# Patient Record
Sex: FEMALE | Race: WHITE
[De-identification: names, ages, dates, MRNs, and addresses within clinical notes are randomized per-mention and may not be internally consistent; named-entity substitution may affect disease eponyms.]

---

## 2017-04-27 ENCOUNTER — PRIOR ORIGINAL RECORDS (OUTPATIENT)
Dept: OTHER | Age: 57
End: 2017-04-27

## 2017-06-27 ENCOUNTER — HOSPITAL ENCOUNTER (OUTPATIENT)
Dept: MAMMOGRAPHY | Facility: HOSPITAL | Age: 57
Discharge: HOME | End: 2017-06-27
Attending: FAMILY MEDICINE
Payer: COMMERCIAL

## 2017-06-27 DIAGNOSIS — R92.8 ABNORMAL SCREENING MAMMOGRAM: ICD-10-CM

## 2017-06-27 PROCEDURE — 77066 DX MAMMO INCL CAD BI: CPT | Performed by: FAMILY MEDICINE

## 2017-12-14 ENCOUNTER — LAB ENCOUNTER (OUTPATIENT)
Dept: LAB | Facility: HOSPITAL | Age: 57
End: 2017-12-14
Attending: FAMILY MEDICINE
Payer: COMMERCIAL

## 2017-12-14 DIAGNOSIS — R35.0 URINARY FREQUENCY: Primary | ICD-10-CM

## 2017-12-14 PROCEDURE — 87086 URINE CULTURE/COLONY COUNT: CPT

## 2017-12-14 PROCEDURE — 81001 URINALYSIS AUTO W/SCOPE: CPT

## 2017-12-18 ENCOUNTER — OFFICE VISIT (OUTPATIENT)
Dept: OBGYN CLINIC | Facility: CLINIC | Age: 57
End: 2017-12-18

## 2017-12-18 VITALS
RESPIRATION RATE: 18 BRPM | HEART RATE: 64 BPM | SYSTOLIC BLOOD PRESSURE: 124 MMHG | WEIGHT: 218 LBS | DIASTOLIC BLOOD PRESSURE: 66 MMHG | HEIGHT: 62 IN | BODY MASS INDEX: 40.12 KG/M2

## 2017-12-18 DIAGNOSIS — Z01.419 WELL WOMAN EXAM WITH ROUTINE GYNECOLOGICAL EXAM: Primary | ICD-10-CM

## 2017-12-18 PROCEDURE — 87624 HPV HI-RISK TYP POOLED RSLT: CPT | Performed by: OBSTETRICS & GYNECOLOGY

## 2017-12-18 PROCEDURE — 88175 CYTOPATH C/V AUTO FLUID REDO: CPT | Performed by: OBSTETRICS & GYNECOLOGY

## 2017-12-18 PROCEDURE — 99386 PREV VISIT NEW AGE 40-64: CPT | Performed by: OBSTETRICS & GYNECOLOGY

## 2017-12-18 RX ORDER — SERTRALINE HYDROCHLORIDE 25 MG/1
TABLET, FILM COATED ORAL DAILY
COMMUNITY
Start: 2017-12-05 | End: 2019-03-14

## 2017-12-18 RX ORDER — ALPRAZOLAM 0.5 MG/1
0.25 TABLET ORAL AS NEEDED
Refills: 0 | COMMUNITY
Start: 2017-12-05

## 2017-12-18 NOTE — PROGRESS NOTES
Judge Sidhu is a 61year old female  No LMP recorded. Patient is not currently having periods (Reason: Menopause). Patient presents with:  Wellness Visit: new pt annual. pt notices odor with urinatio off and on x 1 month.   .  She has no complaints on file     Other Topics Concern    Caffeine Concern No    Exercise No     Social History Narrative   None on file       FAMILY HISTORY:  Family History   Problem Relation Age of Onset   • Cancer Mother    • Breast Cancer Mother 54   • Other Lidia Reyes Mother thyromegaly, no nodules, no adenopathy  Breast: normal without palpable masses, tenderness, asymmetry, nipple discharge, nipple retraction or skin changes  Abdomen:  soft, nontender, nondistended, no masses  Skin/Hair: no unusual rashes or bruises  Extremi

## 2018-03-10 ENCOUNTER — APPOINTMENT (OUTPATIENT)
Dept: GENERAL RADIOLOGY | Facility: HOSPITAL | Age: 58
End: 2018-03-10
Attending: EMERGENCY MEDICINE
Payer: COMMERCIAL

## 2018-03-10 ENCOUNTER — HOSPITAL ENCOUNTER (EMERGENCY)
Facility: HOSPITAL | Age: 58
Discharge: HOME | End: 2018-03-10
Attending: EMERGENCY MEDICINE
Payer: COMMERCIAL

## 2018-03-10 ENCOUNTER — PRIOR ORIGINAL RECORDS (OUTPATIENT)
Dept: OTHER | Age: 58
End: 2018-03-10

## 2018-03-10 VITALS
SYSTOLIC BLOOD PRESSURE: 125 MMHG | HEIGHT: 62 IN | RESPIRATION RATE: 16 BRPM | WEIGHT: 200 LBS | BODY MASS INDEX: 36.8 KG/M2 | HEART RATE: 80 BPM | DIASTOLIC BLOOD PRESSURE: 78 MMHG | OXYGEN SATURATION: 100 % | TEMPERATURE: 98 F

## 2018-03-10 DIAGNOSIS — K21.00 GASTROESOPHAGEAL REFLUX DISEASE WITH ESOPHAGITIS: Primary | ICD-10-CM

## 2018-03-10 LAB
ALBUMIN SERPL-MCNC: 3.7 G/DL (ref 3.5–4.8)
ALP LIVER SERPL-CCNC: 71 U/L (ref 46–118)
ALT SERPL-CCNC: 19 U/L (ref 14–54)
AST SERPL-CCNC: 10 U/L (ref 15–41)
BASOPHILS # BLD AUTO: 0.03 X10(3) UL (ref 0–0.1)
BASOPHILS NFR BLD AUTO: 0.4 %
BILIRUB SERPL-MCNC: 0.8 MG/DL (ref 0.1–2)
BUN BLD-MCNC: 14 MG/DL (ref 8–20)
CALCIUM BLD-MCNC: 9.5 MG/DL (ref 8.3–10.3)
CHLORIDE: 106 MMOL/L (ref 101–111)
CO2: 26 MMOL/L (ref 22–32)
CREAT BLD-MCNC: 0.82 MG/DL (ref 0.55–1.02)
EOSINOPHIL # BLD AUTO: 0.04 X10(3) UL (ref 0–0.3)
EOSINOPHIL NFR BLD AUTO: 0.5 %
ERYTHROCYTE [DISTWIDTH] IN BLOOD BY AUTOMATED COUNT: 12.8 % (ref 11.5–16)
GLUCOSE BLD-MCNC: 107 MG/DL (ref 70–99)
HCT VFR BLD AUTO: 39.7 % (ref 34–50)
HGB BLD-MCNC: 13 G/DL (ref 12–16)
IMMATURE GRANULOCYTE COUNT: 0.02 X10(3) UL (ref 0–1)
IMMATURE GRANULOCYTE RATIO %: 0.3 %
LYMPHOCYTES # BLD AUTO: 1.81 X10(3) UL (ref 0.9–4)
LYMPHOCYTES NFR BLD AUTO: 22.9 %
M PROTEIN MFR SERPL ELPH: 7.8 G/DL (ref 6.1–8.3)
MCH RBC QN AUTO: 29.3 PG (ref 27–33.2)
MCHC RBC AUTO-ENTMCNC: 32.7 G/DL (ref 31–37)
MCV RBC AUTO: 89.6 FL (ref 81–100)
MONOCYTES # BLD AUTO: 0.58 X10(3) UL (ref 0.1–1)
MONOCYTES NFR BLD AUTO: 7.3 %
NEUTROPHIL ABS PRELIM: 5.44 X10 (3) UL (ref 1.3–6.7)
NEUTROPHILS # BLD AUTO: 5.44 X10(3) UL (ref 1.3–6.7)
NEUTROPHILS NFR BLD AUTO: 68.6 %
PLATELET # BLD AUTO: 289 10(3)UL (ref 150–450)
POTASSIUM SERPL-SCNC: 3.6 MMOL/L (ref 3.6–5.1)
RBC # BLD AUTO: 4.43 X10(6)UL (ref 3.8–5.1)
RED CELL DISTRIBUTION WIDTH-SD: 41.9 FL (ref 35.1–46.3)
SODIUM SERPL-SCNC: 139 MMOL/L (ref 136–144)
TROPONIN: <0.046 NG/ML (ref ?–0.05)
WBC # BLD AUTO: 7.9 X10(3) UL (ref 4–13)

## 2018-03-10 PROCEDURE — 93010 ELECTROCARDIOGRAM REPORT: CPT

## 2018-03-10 PROCEDURE — 84484 ASSAY OF TROPONIN QUANT: CPT | Performed by: EMERGENCY MEDICINE

## 2018-03-10 PROCEDURE — 99285 EMERGENCY DEPT VISIT HI MDM: CPT

## 2018-03-10 PROCEDURE — 74220 X-RAY XM ESOPHAGUS 1CNTRST: CPT | Performed by: EMERGENCY MEDICINE

## 2018-03-10 PROCEDURE — 85025 COMPLETE CBC W/AUTO DIFF WBC: CPT | Performed by: EMERGENCY MEDICINE

## 2018-03-10 PROCEDURE — 80053 COMPREHEN METABOLIC PANEL: CPT | Performed by: EMERGENCY MEDICINE

## 2018-03-10 PROCEDURE — 93005 ELECTROCARDIOGRAM TRACING: CPT

## 2018-03-10 PROCEDURE — 71045 X-RAY EXAM CHEST 1 VIEW: CPT | Performed by: EMERGENCY MEDICINE

## 2018-03-10 PROCEDURE — 36415 COLL VENOUS BLD VENIPUNCTURE: CPT

## 2018-03-10 RX ORDER — MAGNESIUM HYDROXIDE/ALUMINUM HYDROXICE/SIMETHICONE 120; 1200; 1200 MG/30ML; MG/30ML; MG/30ML
30 SUSPENSION ORAL ONCE
Status: COMPLETED | OUTPATIENT
Start: 2018-03-10 | End: 2018-03-10

## 2018-03-10 NOTE — ED NOTES
Pt awake and alert, appears comfortable. Pt c/o discomfort to \"esophagus\" with swallowing. Pt states that when she tries to eat, she has no discomfort to throat, but feels discomfort near chest when food is going down.  Pt states water does not cause any

## 2018-03-10 NOTE — ED INITIAL ASSESSMENT (HPI)
Pt c/o foreign body in esophagus, Pt rpt eating dough pretzel yesterday that got \"stuck\" Pt states she was able to eat dinner last night and has tolerated liquids w/o difficulty; but has persistent discomfort in lower GI area.  Pt denies vomiting

## 2018-03-10 NOTE — ED PROVIDER NOTES
Patient Seen in: [facility] Emergency Department    History   Patient presents with:  FB in Throat (GI, respiratory)    Stated Complaint: Foreign body stuck in esophagus     HPI    62-year-old female comes the hospital with a complaint of having dif negative except as noted above.     Physical Exam   ED Triage Vitals [03/10/18 1722]  BP: 140/85  Pulse: 80  Resp: 18  Temp: 97.9 °F (36.6 °C)  Temp src: Temporal  SpO2: 98 %  O2 Device: None (Room air)    Current:/85   Pulse 80   Temp 97.9 °F (36.6 ° INDICATIONS:  Foreign body stuck in esophagus  PATIENT STATED HISTORY: (As transcribed by Technologist)  Patient feels as though a pretzel got stuck in her esophagus.    FLUOROSCOPY IMAGES OBTAINED:  17 FLUOROSCOPY TIME:  0:51MIN RADIATION DOSE (AIR KERMA P 3/10/18 1744)       ED Course as of Mar 10 1900  ------------------------------------------------------------    Patient is feeling better while here. Patient symptoms consistent with reflux and esophagitis.   Patient will be discharged home and continue w

## 2018-03-11 LAB
ATRIAL RATE: 70 BPM
P AXIS: 62 DEGREES
P-R INTERVAL: 140 MS
Q-T INTERVAL: 396 MS
QRS DURATION: 94 MS
QTC CALCULATION (BEZET): 427 MS
R AXIS: -10 DEGREES
T AXIS: -3 DEGREES
VENTRICULAR RATE: 70 BPM

## 2018-05-15 ENCOUNTER — MYAURORA ACCOUNT LINK (OUTPATIENT)
Dept: OTHER | Age: 58
End: 2018-05-15

## 2018-05-15 ENCOUNTER — PRIOR ORIGINAL RECORDS (OUTPATIENT)
Dept: OTHER | Age: 58
End: 2018-05-15

## 2018-05-25 ENCOUNTER — HOSPITAL ENCOUNTER (OUTPATIENT)
Dept: MAMMOGRAPHY | Facility: HOSPITAL | Age: 58
Discharge: HOME | End: 2018-05-25
Attending: SURGERY
Payer: COMMERCIAL

## 2018-05-25 DIAGNOSIS — Z98.890 STATUS POST LEFT BREAST LUMPECTOMY: ICD-10-CM

## 2018-05-25 DIAGNOSIS — Z86.000 HISTORY OF LOBULAR CARCINOMA IN SITU OF BREAST: ICD-10-CM

## 2018-05-25 PROCEDURE — 77062 BREAST TOMOSYNTHESIS BI: CPT | Performed by: SURGERY

## 2018-05-25 PROCEDURE — 76642 ULTRASOUND BREAST LIMITED: CPT | Performed by: SURGERY

## 2018-05-25 PROCEDURE — 77066 DX MAMMO INCL CAD BI: CPT | Performed by: SURGERY

## 2018-06-01 LAB
ALBUMIN: 3.7 G/DL
ALKALINE PHOSPHATATE(ALK PHOS): 71 IU/L
BILIRUBIN TOTAL: 0.8 MG/DL
BUN: 14 MG/DL
CALCIUM: 9.5 MG/DL
CHLORIDE: 106 MEQ/L
CREATININE, SERUM: 0.82 MG/DL
GLUCOSE: 107 MG/DL
HEMATOCRIT: 39.7 %
HEMOGLOBIN: 13 G/DL
PLATELETS: 289 K/UL
POTASSIUM, SERUM: 3.6 MEQ/L
PROTEIN, TOTAL: 7.8 G/DL
RED BLOOD COUNT: 4.43 X 10-6/U
SGOT (AST): 10 IU/L
SGPT (ALT): 19 IU/L
SODIUM: 139 MEQ/L
WHITE BLOOD COUNT: 7.9 X 10-3/U

## 2018-07-09 ENCOUNTER — SURGERY (OUTPATIENT)
Age: 58
End: 2018-07-09

## 2018-07-09 ENCOUNTER — HOSPITAL ENCOUNTER (OUTPATIENT)
Facility: HOSPITAL | Age: 58
Setting detail: HOSPITAL OUTPATIENT SURGERY
Discharge: HOME | End: 2018-07-09
Attending: INTERNAL MEDICINE | Admitting: INTERNAL MEDICINE
Payer: COMMERCIAL

## 2018-07-09 VITALS
HEART RATE: 76 BPM | SYSTOLIC BLOOD PRESSURE: 118 MMHG | DIASTOLIC BLOOD PRESSURE: 87 MMHG | TEMPERATURE: 98 F | HEIGHT: 62 IN | RESPIRATION RATE: 16 BRPM | WEIGHT: 212 LBS | BODY MASS INDEX: 39.01 KG/M2 | OXYGEN SATURATION: 98 %

## 2018-07-09 DIAGNOSIS — Z12.11 COLON CANCER SCREENING: ICD-10-CM

## 2018-07-09 DIAGNOSIS — K21.9 GASTROESOPHAGEAL REFLUX DISEASE, ESOPHAGITIS PRESENCE NOT SPECIFIED: ICD-10-CM

## 2018-07-09 DIAGNOSIS — R13.19 ESOPHAGEAL DYSPHAGIA: ICD-10-CM

## 2018-07-09 PROCEDURE — 0DBB8ZX EXCISION OF ILEUM, VIA NATURAL OR ARTIFICIAL OPENING ENDOSCOPIC, DIAGNOSTIC: ICD-10-PCS | Performed by: INTERNAL MEDICINE

## 2018-07-09 PROCEDURE — 99153 MOD SED SAME PHYS/QHP EA: CPT | Performed by: INTERNAL MEDICINE

## 2018-07-09 PROCEDURE — 0DB38ZX EXCISION OF LOWER ESOPHAGUS, VIA NATURAL OR ARTIFICIAL OPENING ENDOSCOPIC, DIAGNOSTIC: ICD-10-PCS | Performed by: INTERNAL MEDICINE

## 2018-07-09 PROCEDURE — 99152 MOD SED SAME PHYS/QHP 5/>YRS: CPT | Performed by: INTERNAL MEDICINE

## 2018-07-09 PROCEDURE — 0DB18ZX EXCISION OF UPPER ESOPHAGUS, VIA NATURAL OR ARTIFICIAL OPENING ENDOSCOPIC, DIAGNOSTIC: ICD-10-PCS | Performed by: INTERNAL MEDICINE

## 2018-07-09 PROCEDURE — 88305 TISSUE EXAM BY PATHOLOGIST: CPT | Performed by: INTERNAL MEDICINE

## 2018-07-09 PROCEDURE — 0DB68ZX EXCISION OF STOMACH, VIA NATURAL OR ARTIFICIAL OPENING ENDOSCOPIC, DIAGNOSTIC: ICD-10-PCS | Performed by: INTERNAL MEDICINE

## 2018-07-09 RX ORDER — MIDAZOLAM HYDROCHLORIDE 1 MG/ML
INJECTION INTRAMUSCULAR; INTRAVENOUS
Status: DISCONTINUED | OUTPATIENT
Start: 2018-07-09 | End: 2018-07-09

## 2018-07-09 RX ORDER — SODIUM CHLORIDE, SODIUM LACTATE, POTASSIUM CHLORIDE, CALCIUM CHLORIDE 600; 310; 30; 20 MG/100ML; MG/100ML; MG/100ML; MG/100ML
INJECTION, SOLUTION INTRAVENOUS CONTINUOUS
Status: DISCONTINUED | OUTPATIENT
Start: 2018-07-09 | End: 2018-07-09

## 2018-07-09 NOTE — OPERATIVE REPORT
[facility]  Esophagogastroduodenoscopy and Colonoscopy Report    [address] Patient Status:  Hospital Outpatient Surgery   Date of Birth 1/27/1960 MRN [id number]   [facility] ENDOSCOPY Attending Harry Root MD esophagus and advanced to the descending duodenum. The scope was withdrawn and the mucosa was observed for abnormalities. Subsequently, the Olympus video colonoscope was inserted into the rectum and advanced to the terminal ileum.   The scope was withdraw

## 2018-07-09 NOTE — H&P
[facility]  Pre-procedure History and Physical      Yoon Alvarado Patient Status:  Hospital Outpatient Surgery    1960 MRN [id number]   [facility] ENDOSCOPY Attending Angel Reyes MD   Hosp Day # 0 PCP Sal Del Cid MD        risks, including bleeding, perforation and anesthetic complications. The limitations of the procedure were reviewed. The patient agrees and all questions were answered.  2

## 2018-10-02 ENCOUNTER — OFFICE VISIT (OUTPATIENT)
Dept: FAMILY MEDICINE CLINIC | Facility: CLINIC | Age: 58
End: 2018-10-02
Payer: COMMERCIAL

## 2018-10-02 VITALS
OXYGEN SATURATION: 98 % | SYSTOLIC BLOOD PRESSURE: 134 MMHG | DIASTOLIC BLOOD PRESSURE: 72 MMHG | RESPIRATION RATE: 16 BRPM | WEIGHT: 210 LBS | BODY MASS INDEX: 38.64 KG/M2 | HEIGHT: 62 IN | HEART RATE: 80 BPM | TEMPERATURE: 98 F

## 2018-10-02 DIAGNOSIS — R05.9 COUGH: ICD-10-CM

## 2018-10-02 DIAGNOSIS — J01.00 ACUTE NON-RECURRENT MAXILLARY SINUSITIS: Primary | ICD-10-CM

## 2018-10-02 PROCEDURE — 99213 OFFICE O/P EST LOW 20 MIN: CPT | Performed by: NURSE PRACTITIONER

## 2018-10-02 RX ORDER — FLUTICASONE PROPIONATE 50 MCG
2 SPRAY, SUSPENSION (ML) NASAL DAILY
Qty: 1 BOTTLE | Refills: 0 | Status: SHIPPED | OUTPATIENT
Start: 2018-10-02 | End: 2018-10-16

## 2018-10-02 RX ORDER — AMOXICILLIN AND CLAVULANATE POTASSIUM 875; 125 MG/1; MG/1
1 TABLET, FILM COATED ORAL 2 TIMES DAILY
Qty: 20 TABLET | Refills: 0 | Status: SHIPPED | OUTPATIENT
Start: 2018-10-02 | End: 2018-10-12

## 2018-10-03 NOTE — PATIENT INSTRUCTIONS
-   Increase oral fluids to loosen and thin secretions, eat a nutritious diet  -   Tylenol or ibuprofen for pain as packet insert; age appropriate with weight  -   Mucinex or generic equivalent for cough as packet insert  -   Return to clinic if symptoms p happen due to allergies to pollens and other particles in the air. Sinusitis can cause symptoms of sinus congestion and a feeling of fullness. A sinus infection causes fever, headache, and facial pain.  There is often green or yellow fluid draining from the prescribed to you. If you have chronic liver or kidney disease or ever had a stomach ulcer, talk with your healthcare provider before using these medicines. (Aspirin should never be taken by anyone under age 25 who is ill with a fever.  It may cause severe

## 2018-10-03 NOTE — PROGRESS NOTES
CHIEF COMPLAINT:   Patient presents with:  Cough: cough is dry x 5 wks started with chills for 3 dys per pt, tried Mucinex but it did not help, pt states she has hx of PVCs. Cold symptoms for 5 weeks.          HPI:   Carol Gilman is a 60year old female • Osteoarthrosis, unspecified whether generalized or localized, unspecified site     BACK   • PONV (postoperative nausea and vomiting)    • Problems with swallowing    • Tinnitus    • Unifocal PVCs     no medication   • Varicose vein       Past Surgical Hi GENERAL: well developed, well nourished,in no apparent distress  SKIN: no rashes,no suspicious lesions  HEAD: atraumatic, normocephalic,  + tenderness on palpation of maxillary sinuses  EYES: conjunctiva clear, EOM intact  EARS: TM's clear gray, no bulging Si sprays by Each Nare route daily for 14 days.            Patient Instructions   -   Increase oral fluids to loosen and thin secretions, eat a nutritious diet  -   Tylenol or ibuprofen for pain as packet insert; age appropriate with weight  -   Mucin The sinuses are air-filled spaces within the bones of the face. They connect to the inside of the nose. Sinusitis is an inflammation of the tissue that lines the sinuses. Sinusitis can occur during a cold.  It can also happen due to allergies to pollens and · Do not use nasal rinses or irrigation during an acute sinus infection, unless your healthcare provider tells you to. Rinsing may spread the infection to other areas in your sinuses.   · Use acetaminophen or ibuprofen to control pain, unless another pain m

## 2018-10-13 ENCOUNTER — HOSPITAL ENCOUNTER (EMERGENCY)
Facility: HOSPITAL | Age: 58
Discharge: HOME | End: 2018-10-13
Attending: EMERGENCY MEDICINE
Payer: COMMERCIAL

## 2018-10-13 ENCOUNTER — APPOINTMENT (OUTPATIENT)
Dept: GENERAL RADIOLOGY | Facility: HOSPITAL | Age: 58
End: 2018-10-13
Attending: EMERGENCY MEDICINE
Payer: COMMERCIAL

## 2018-10-13 VITALS
DIASTOLIC BLOOD PRESSURE: 68 MMHG | TEMPERATURE: 97 F | WEIGHT: 210 LBS | RESPIRATION RATE: 14 BRPM | HEART RATE: 65 BPM | BODY MASS INDEX: 38 KG/M2 | OXYGEN SATURATION: 100 % | SYSTOLIC BLOOD PRESSURE: 120 MMHG

## 2018-10-13 DIAGNOSIS — H81.399 PERIPHERAL VERTIGO, UNSPECIFIED LATERALITY: Primary | ICD-10-CM

## 2018-10-13 PROCEDURE — 85025 COMPLETE CBC W/AUTO DIFF WBC: CPT | Performed by: EMERGENCY MEDICINE

## 2018-10-13 PROCEDURE — 93005 ELECTROCARDIOGRAM TRACING: CPT

## 2018-10-13 PROCEDURE — 85025 COMPLETE CBC W/AUTO DIFF WBC: CPT

## 2018-10-13 PROCEDURE — 96361 HYDRATE IV INFUSION ADD-ON: CPT

## 2018-10-13 PROCEDURE — 96375 TX/PRO/DX INJ NEW DRUG ADDON: CPT

## 2018-10-13 PROCEDURE — 96374 THER/PROPH/DIAG INJ IV PUSH: CPT

## 2018-10-13 PROCEDURE — 99285 EMERGENCY DEPT VISIT HI MDM: CPT

## 2018-10-13 PROCEDURE — 93010 ELECTROCARDIOGRAM REPORT: CPT

## 2018-10-13 PROCEDURE — 80053 COMPREHEN METABOLIC PANEL: CPT

## 2018-10-13 PROCEDURE — 80053 COMPREHEN METABOLIC PANEL: CPT | Performed by: EMERGENCY MEDICINE

## 2018-10-13 PROCEDURE — 71045 X-RAY EXAM CHEST 1 VIEW: CPT | Performed by: EMERGENCY MEDICINE

## 2018-10-13 RX ORDER — ONDANSETRON 2 MG/ML
4 INJECTION INTRAMUSCULAR; INTRAVENOUS ONCE
Status: COMPLETED | OUTPATIENT
Start: 2018-10-13 | End: 2018-10-13

## 2018-10-13 RX ORDER — ONDANSETRON 4 MG/1
4 TABLET, ORALLY DISINTEGRATING ORAL EVERY 4 HOURS PRN
Qty: 10 TABLET | Refills: 0 | Status: SHIPPED | OUTPATIENT
Start: 2018-10-13 | End: 2018-10-20

## 2018-10-13 RX ORDER — MECLIZINE HYDROCHLORIDE 25 MG/1
25 TABLET ORAL ONCE
Status: COMPLETED | OUTPATIENT
Start: 2018-10-13 | End: 2018-10-13

## 2018-10-13 RX ORDER — MECLIZINE HYDROCHLORIDE 25 MG/1
25 TABLET ORAL 3 TIMES DAILY PRN
Qty: 20 TABLET | Refills: 0 | Status: SHIPPED | OUTPATIENT
Start: 2018-10-13 | End: 2020-12-29

## 2018-10-13 NOTE — ED INITIAL ASSESSMENT (HPI)
Cleaning her house this morning and started to have dizziness that would not resolve. Blood pressure was [id number] over 80s and took 1/3 of Xanax as she thought she was having a panic attack and took 1/2 Atenolol, which she usually takes for PVCs.   Denies thomas

## 2018-10-13 NOTE — ED PROVIDER NOTES
Patient Seen in: [facility] Emergency Department    History   Patient presents with:  Dizziness (neurologic)    Stated Complaint: dizziness    HPI    Patient presents with dizziness.   The patient started to feel dizzy at 830 this morning when she wa BREAST BIOPSY NEEDLE LOCALIZATION Left 1/9/2014    Performed by Evangelina Ashby MD at [address] [place] [address] MAIN OR   • CHOLECYSTECTOMY      2004   • COLONOSCOPY N/A 7/9/2018    Procedure: COLONOSCOPY;  Surgeon: Paul Mijares MD;  Location: [address] [place] [address] ENDOSCOPY   • COLONOSCOPY N/A PANEL (14) - Abnormal; Notable for the following components:       Result Value    A/G Ratio 0.8 (*)     All other components within normal limits   CBC WITH DIFFERENTIAL WITH PLATELET    Narrative:      The following orders were created for panel order CBC (0 mL Intravenous Stopped 10/13/18 8915)     The patient was given the above medications and did feel better. She was still a little dizzy ambulation but able to ambulate with a steady gait.     MDM   I think the patient's symptoms are consistent with blanca

## 2019-02-28 VITALS
BODY MASS INDEX: 40.97 KG/M2 | SYSTOLIC BLOOD PRESSURE: 114 MMHG | DIASTOLIC BLOOD PRESSURE: 70 MMHG | HEART RATE: 82 BPM | HEIGHT: 61 IN | WEIGHT: 217 LBS

## 2019-03-01 VITALS
WEIGHT: 211 LBS | DIASTOLIC BLOOD PRESSURE: 78 MMHG | HEIGHT: 61 IN | BODY MASS INDEX: 39.84 KG/M2 | HEART RATE: 82 BPM | SYSTOLIC BLOOD PRESSURE: 134 MMHG

## 2019-03-15 NOTE — ED INITIAL ASSESSMENT (HPI)
Patient presents with a panic attack that started this evening. She took xanax at home prior to arrival without relief. Denies chest pain.

## 2019-03-15 NOTE — ED PROVIDER NOTES
Patient Seen in: [facility] Emergency Department    History   Patient presents with: Anxiety/Panic attack (neurologic)    Stated Complaint: panic attack    HPI    Patient is a 26-year-old woman with history of panic attacks.   Complains of anxiety s HPI.  Constitutional and vital signs reviewed. All other systems reviewed and negative except as noted above.     Physical Exam     ED Triage Vitals [03/14/19 2248]   /77   Pulse 104   Resp 24   Temp 98.3 °F (36.8 °C)   Temp src Temporal   SpO2 9 T4 - Normal   CBC WITH DIFFERENTIAL WITH PLATELET    Narrative: The following orders were created for panel order CBC WITH DIFFERENTIAL WITH PLATELET.   Procedure                               Abnormality         Status                     ---------

## 2019-03-20 ENCOUNTER — TELEPHONE (OUTPATIENT)
Dept: CARDIOLOGY | Age: 59
End: 2019-03-20

## 2019-03-29 RX ORDER — ATENOLOL 25 MG/1
1 TABLET ORAL 2 TIMES DAILY
COMMUNITY
Start: 2017-12-12 | End: 2019-04-04 | Stop reason: SDUPTHER

## 2019-03-29 RX ORDER — MULTIVITAMIN WITH IRON
TABLET ORAL 2 TIMES DAILY
COMMUNITY
Start: 2014-07-29 | End: 2019-09-11 | Stop reason: CLARIF

## 2019-03-29 RX ORDER — ALPRAZOLAM 0.5 MG/1
TABLET ORAL PRN
COMMUNITY
Start: 2017-04-27 | End: 2020-12-22 | Stop reason: CLARIF

## 2019-04-03 PROCEDURE — 93268 ECG RECORD/REVIEW: CPT | Performed by: INTERNAL MEDICINE

## 2019-04-04 ENCOUNTER — OFFICE VISIT (OUTPATIENT)
Dept: CARDIOLOGY | Age: 59
End: 2019-04-04

## 2019-04-04 ENCOUNTER — ANCILLARY PROCEDURE (OUTPATIENT)
Dept: CARDIOLOGY | Age: 59
End: 2019-04-04
Attending: NURSE PRACTITIONER

## 2019-04-04 DIAGNOSIS — I49.3 PVC'S (PREMATURE VENTRICULAR CONTRACTIONS): ICD-10-CM

## 2019-04-04 DIAGNOSIS — R00.2 PALPITATIONS: ICD-10-CM

## 2019-04-04 DIAGNOSIS — R00.2 PALPITATIONS: Primary | ICD-10-CM

## 2019-04-04 PROCEDURE — 99215 OFFICE O/P EST HI 40 MIN: CPT | Performed by: NURSE PRACTITIONER

## 2019-04-04 RX ORDER — PANTOPRAZOLE SODIUM 40 MG/1
40 TABLET, DELAYED RELEASE ORAL
COMMUNITY
Start: 2014-07-29

## 2019-04-04 RX ORDER — DIPHENOXYLATE HYDROCHLORIDE AND ATROPINE SULFATE 2.5; .025 MG/1; MG/1
TABLET ORAL
COMMUNITY
Start: 2011-10-20

## 2019-04-04 RX ORDER — ATENOLOL 25 MG/1
25 TABLET ORAL 2 TIMES DAILY
COMMUNITY
End: 2019-04-04 | Stop reason: SDUPTHER

## 2019-04-04 RX ORDER — ATENOLOL 25 MG/1
12.5 TABLET ORAL 2 TIMES DAILY
Qty: 30 TABLET | Refills: 3 | Status: SHIPPED | OUTPATIENT
Start: 2019-04-04 | End: 2019-05-17 | Stop reason: DRUGHIGH

## 2019-04-04 RX ORDER — MECLIZINE HYDROCHLORIDE 25 MG/1
25 TABLET ORAL
COMMUNITY
Start: 2018-10-13 | End: 2020-12-22 | Stop reason: CLARIF

## 2019-04-04 SDOH — HEALTH STABILITY: MENTAL HEALTH: HOW OFTEN DO YOU HAVE A DRINK CONTAINING ALCOHOL?: NEVER

## 2019-04-04 SDOH — HEALTH STABILITY: PHYSICAL HEALTH: ON AVERAGE, HOW MANY MINUTES DO YOU ENGAGE IN EXERCISE AT THIS LEVEL?: 20 MIN

## 2019-04-04 SDOH — HEALTH STABILITY: PHYSICAL HEALTH: ON AVERAGE, HOW MANY DAYS PER WEEK DO YOU ENGAGE IN MODERATE TO STRENUOUS EXERCISE (LIKE A BRISK WALK)?: 7 DAYS

## 2019-04-04 ASSESSMENT — ENCOUNTER SYMPTOMS
COUGH: 0
HEMATOCHEZIA: 0
CHILLS: 0
FEVER: 0
ALLERGIC/IMMUNOLOGIC COMMENTS: NO NEW FOOD ALLERGIES
BRUISES/BLEEDS EASILY: 0
WEIGHT LOSS: 0
WEIGHT GAIN: 0
HEMOPTYSIS: 0
SUSPICIOUS LESIONS: 0
NERVOUS/ANXIOUS: 1

## 2019-04-04 ASSESSMENT — PATIENT HEALTH QUESTIONNAIRE - PHQ9
SUM OF ALL RESPONSES TO PHQ9 QUESTIONS 1 AND 2: 0
SUM OF ALL RESPONSES TO PHQ9 QUESTIONS 1 AND 2: 0
1. LITTLE INTEREST OR PLEASURE IN DOING THINGS: NOT AT ALL
2. FEELING DOWN, DEPRESSED OR HOPELESS: NOT AT ALL

## 2019-04-04 ASSESSMENT — PAIN SCALES - GENERAL: PAINLEVEL: 0

## 2019-05-17 ENCOUNTER — OFFICE VISIT (OUTPATIENT)
Dept: CARDIOLOGY | Age: 59
End: 2019-05-17

## 2019-05-17 VITALS
BODY MASS INDEX: 37.36 KG/M2 | HEART RATE: 66 BPM | HEIGHT: 62 IN | SYSTOLIC BLOOD PRESSURE: 110 MMHG | WEIGHT: 203 LBS | DIASTOLIC BLOOD PRESSURE: 68 MMHG

## 2019-05-17 DIAGNOSIS — R06.09 DYSPNEA ON EXERTION: Primary | ICD-10-CM

## 2019-05-17 DIAGNOSIS — R00.2 PALPITATIONS: ICD-10-CM

## 2019-05-17 DIAGNOSIS — I49.3 VENTRICULAR PREMATURE DEPOLARIZATION: ICD-10-CM

## 2019-05-17 DIAGNOSIS — I49.3 PVC'S (PREMATURE VENTRICULAR CONTRACTIONS): ICD-10-CM

## 2019-05-17 PROCEDURE — 99214 OFFICE O/P EST MOD 30 MIN: CPT | Performed by: INTERNAL MEDICINE

## 2019-05-17 RX ORDER — DILTIAZEM HYDROCHLORIDE 120 MG/1
120 CAPSULE, COATED, EXTENDED RELEASE ORAL DAILY
COMMUNITY
End: 2019-05-17 | Stop reason: SDUPTHER

## 2019-05-17 RX ORDER — DILTIAZEM HYDROCHLORIDE 120 MG/1
120 CAPSULE, COATED, EXTENDED RELEASE ORAL DAILY
Qty: 30 CAPSULE | Refills: 10 | Status: SHIPPED | OUTPATIENT
Start: 2019-05-17 | End: 2019-09-11 | Stop reason: CLARIF

## 2019-05-17 ASSESSMENT — ENCOUNTER SYMPTOMS
WEIGHT GAIN: 0
ALLERGIC/IMMUNOLOGIC COMMENTS: NO NEW FOOD ALLERGIES
WEIGHT LOSS: 0
SUSPICIOUS LESIONS: 0
FEVER: 0
HEMOPTYSIS: 0
CHILLS: 0
BRUISES/BLEEDS EASILY: 0
HEMATOCHEZIA: 0
COUGH: 0

## 2019-05-23 ENCOUNTER — E-ADVICE (OUTPATIENT)
Dept: CARDIOLOGY | Age: 59
End: 2019-05-23

## 2019-06-12 ENCOUNTER — OFFICE VISIT (OUTPATIENT)
Dept: CARDIOLOGY | Age: 59
End: 2019-06-12

## 2019-06-12 VITALS
BODY MASS INDEX: 36.07 KG/M2 | SYSTOLIC BLOOD PRESSURE: 116 MMHG | HEART RATE: 64 BPM | HEIGHT: 62 IN | WEIGHT: 196 LBS | DIASTOLIC BLOOD PRESSURE: 70 MMHG

## 2019-06-12 DIAGNOSIS — R00.2 PALPITATIONS: Primary | ICD-10-CM

## 2019-06-12 DIAGNOSIS — I49.3 PVC'S (PREMATURE VENTRICULAR CONTRACTIONS): ICD-10-CM

## 2019-06-12 PROCEDURE — 99245 OFF/OP CONSLTJ NEW/EST HI 55: CPT | Performed by: INTERNAL MEDICINE

## 2019-07-07 ENCOUNTER — HOSPITAL ENCOUNTER (INPATIENT)
Facility: HOSPITAL | Age: 59
LOS: 1 days | Discharge: HOME | DRG: 343 | End: 2019-07-08
Attending: EMERGENCY MEDICINE | Admitting: SURGERY
Payer: COMMERCIAL

## 2019-07-07 ENCOUNTER — APPOINTMENT (OUTPATIENT)
Dept: CT IMAGING | Facility: HOSPITAL | Age: 59
DRG: 343 | End: 2019-07-07
Attending: EMERGENCY MEDICINE
Payer: COMMERCIAL

## 2019-07-07 DIAGNOSIS — K35.80 ACUTE APPENDICITIS: ICD-10-CM

## 2019-07-07 DIAGNOSIS — K35.80 ACUTE APPENDICITIS, UNSPECIFIED ACUTE APPENDICITIS TYPE: Primary | ICD-10-CM

## 2019-07-07 LAB
ALBUMIN SERPL-MCNC: 4.1 G/DL (ref 3.4–5)
ALBUMIN/GLOB SERPL: 1 {RATIO} (ref 1–2)
ALP LIVER SERPL-CCNC: 63 U/L (ref 46–118)
ALT SERPL-CCNC: 20 U/L (ref 13–56)
ANION GAP SERPL CALC-SCNC: 4 MMOL/L (ref 0–18)
AST SERPL-CCNC: 14 U/L (ref 15–37)
BASOPHILS # BLD AUTO: 0.04 X10(3) UL (ref 0–0.2)
BASOPHILS NFR BLD AUTO: 0.3 %
BILIRUB SERPL-MCNC: 1.1 MG/DL (ref 0.1–2)
BILIRUB UR QL STRIP.AUTO: NEGATIVE
BUN BLD-MCNC: 12 MG/DL (ref 7–18)
BUN/CREAT SERPL: 14 (ref 10–20)
CALCIUM BLD-MCNC: 9.4 MG/DL (ref 8.5–10.1)
CHLORIDE SERPL-SCNC: 106 MMOL/L (ref 98–112)
CO2 SERPL-SCNC: 28 MMOL/L (ref 21–32)
COLOR UR AUTO: YELLOW
CREAT BLD-MCNC: 0.86 MG/DL (ref 0.55–1.02)
DEPRECATED RDW RBC AUTO: 41.7 FL (ref 35.1–46.3)
EOSINOPHIL # BLD AUTO: 0 X10(3) UL (ref 0–0.7)
EOSINOPHIL NFR BLD AUTO: 0 %
ERYTHROCYTE [DISTWIDTH] IN BLOOD BY AUTOMATED COUNT: 12.7 % (ref 11–15)
GLOBULIN PLAS-MCNC: 4.1 G/DL (ref 2.8–4.4)
GLUCOSE BLD-MCNC: 96 MG/DL (ref 70–99)
GLUCOSE UR STRIP.AUTO-MCNC: NEGATIVE MG/DL
HCT VFR BLD AUTO: 44.2 % (ref 35–48)
HGB BLD-MCNC: 15.1 G/DL (ref 12–16)
IMM GRANULOCYTES # BLD AUTO: 0.03 X10(3) UL (ref 0–1)
IMM GRANULOCYTES NFR BLD: 0.2 %
KETONES UR STRIP.AUTO-MCNC: 20 MG/DL
LIPASE SERPL-CCNC: 146 U/L (ref 73–393)
LYMPHOCYTES # BLD AUTO: 1.5 X10(3) UL (ref 1–4)
LYMPHOCYTES NFR BLD AUTO: 11.9 %
M PROTEIN MFR SERPL ELPH: 8.2 G/DL (ref 6.4–8.2)
MCH RBC QN AUTO: 30.9 PG (ref 26–34)
MCHC RBC AUTO-ENTMCNC: 34.2 G/DL (ref 31–37)
MCV RBC AUTO: 90.6 FL (ref 80–100)
MONOCYTES # BLD AUTO: 0.87 X10(3) UL (ref 0.1–1)
MONOCYTES NFR BLD AUTO: 6.9 %
NEUTROPHILS # BLD AUTO: 10.16 X10 (3) UL (ref 1.5–7.7)
NEUTROPHILS # BLD AUTO: 10.16 X10(3) UL (ref 1.5–7.7)
NEUTROPHILS NFR BLD AUTO: 80.7 %
NITRITE UR QL STRIP.AUTO: NEGATIVE
OSMOLALITY SERPL CALC.SUM OF ELEC: 286 MOSM/KG (ref 275–295)
PH UR STRIP.AUTO: 5 [PH] (ref 4.5–8)
PLATELET # BLD AUTO: 273 10(3)UL (ref 150–450)
POTASSIUM SERPL-SCNC: 3.4 MMOL/L (ref 3.5–5.1)
PROT UR STRIP.AUTO-MCNC: NEGATIVE MG/DL
RBC # BLD AUTO: 4.88 X10(6)UL (ref 3.8–5.3)
RBC UR QL AUTO: NEGATIVE
SODIUM SERPL-SCNC: 138 MMOL/L (ref 136–145)
SP GR UR STRIP.AUTO: 1.01 (ref 1–1.03)
UROBILINOGEN UR STRIP.AUTO-MCNC: <2 MG/DL
WBC # BLD AUTO: 12.6 X10(3) UL (ref 4–11)

## 2019-07-07 PROCEDURE — 74177 CT ABD & PELVIS W/CONTRAST: CPT | Performed by: EMERGENCY MEDICINE

## 2019-07-07 RX ORDER — SODIUM CHLORIDE 9 MG/ML
1000 INJECTION, SOLUTION INTRAVENOUS ONCE
Status: COMPLETED | OUTPATIENT
Start: 2019-07-07 | End: 2019-07-07

## 2019-07-07 RX ORDER — LIDOCAINE HYDROCHLORIDE 20 MG/ML
10 SOLUTION OROPHARYNGEAL ONCE
Status: COMPLETED | OUTPATIENT
Start: 2019-07-07 | End: 2019-07-07

## 2019-07-07 RX ORDER — MAGNESIUM HYDROXIDE/ALUMINUM HYDROXICE/SIMETHICONE 120; 1200; 1200 MG/30ML; MG/30ML; MG/30ML
30 SUSPENSION ORAL ONCE
Status: COMPLETED | OUTPATIENT
Start: 2019-07-07 | End: 2019-07-07

## 2019-07-07 RX ORDER — POTASSIUM CHLORIDE 20 MEQ/1
20 TABLET, EXTENDED RELEASE ORAL ONCE
Status: DISCONTINUED | OUTPATIENT
Start: 2019-07-07 | End: 2019-07-08

## 2019-07-08 ENCOUNTER — ANESTHESIA EVENT (OUTPATIENT)
Dept: SURGERY | Facility: HOSPITAL | Age: 59
DRG: 343 | End: 2019-07-08
Payer: COMMERCIAL

## 2019-07-08 ENCOUNTER — ANESTHESIA (OUTPATIENT)
Dept: SURGERY | Facility: HOSPITAL | Age: 59
DRG: 343 | End: 2019-07-08
Payer: COMMERCIAL

## 2019-07-08 VITALS
SYSTOLIC BLOOD PRESSURE: 101 MMHG | HEART RATE: 59 BPM | HEIGHT: 62 IN | WEIGHT: 190 LBS | TEMPERATURE: 98 F | RESPIRATION RATE: 20 BRPM | DIASTOLIC BLOOD PRESSURE: 58 MMHG | BODY MASS INDEX: 34.96 KG/M2 | OXYGEN SATURATION: 98 %

## 2019-07-08 PROBLEM — K35.31 ACUTE APPENDICITIS WITH LOCALIZED PERITONITIS AND GANGRENE, WITHOUT PERFORATION OR ABSCESS: Status: ACTIVE | Noted: 2019-07-08

## 2019-07-08 PROBLEM — K35.80 ACUTE APPENDICITIS, UNSPECIFIED ACUTE APPENDICITIS TYPE: Status: ACTIVE | Noted: 2019-07-08

## 2019-07-08 LAB
ATRIAL RATE: 75 BPM
P AXIS: 68 DEGREES
P-R INTERVAL: 142 MS
POTASSIUM SERPL-SCNC: 4.1 MMOL/L (ref 3.5–5.1)
Q-T INTERVAL: 400 MS
QRS DURATION: 94 MS
QTC CALCULATION (BEZET): 446 MS
R AXIS: -11 DEGREES
T AXIS: 7 DEGREES
VENTRICULAR RATE: 75 BPM

## 2019-07-08 PROCEDURE — 44970 LAPAROSCOPY APPENDECTOMY: CPT | Performed by: SURGERY

## 2019-07-08 PROCEDURE — 0DTJ4ZZ RESECTION OF APPENDIX, PERCUTANEOUS ENDOSCOPIC APPROACH: ICD-10-PCS | Performed by: SURGERY

## 2019-07-08 PROCEDURE — 99223 1ST HOSP IP/OBS HIGH 75: CPT | Performed by: SURGERY

## 2019-07-08 RX ORDER — MEPERIDINE HYDROCHLORIDE 25 MG/ML
INJECTION INTRAMUSCULAR; INTRAVENOUS; SUBCUTANEOUS
Status: COMPLETED
Start: 2019-07-08 | End: 2019-07-08

## 2019-07-08 RX ORDER — TRAMADOL HYDROCHLORIDE 50 MG/1
100 TABLET ORAL EVERY 6 HOURS PRN
Status: DISCONTINUED | OUTPATIENT
Start: 2019-07-08 | End: 2019-07-08

## 2019-07-08 RX ORDER — SODIUM CHLORIDE, SODIUM LACTATE, POTASSIUM CHLORIDE, CALCIUM CHLORIDE 600; 310; 30; 20 MG/100ML; MG/100ML; MG/100ML; MG/100ML
INJECTION, SOLUTION INTRAVENOUS CONTINUOUS
Status: DISCONTINUED | OUTPATIENT
Start: 2019-07-08 | End: 2019-07-08 | Stop reason: HOSPADM

## 2019-07-08 RX ORDER — TRAMADOL HYDROCHLORIDE 50 MG/1
50 TABLET ORAL EVERY 6 HOURS PRN
Qty: 20 TABLET | Refills: 0 | Status: SHIPPED | OUTPATIENT
Start: 2019-07-08 | End: 2019-09-20

## 2019-07-08 RX ORDER — SODIUM PHOSPHATE, DIBASIC AND SODIUM PHOSPHATE, MONOBASIC 7; 19 G/133ML; G/133ML
1 ENEMA RECTAL ONCE AS NEEDED
Status: DISCONTINUED | OUTPATIENT
Start: 2019-07-08 | End: 2019-07-08

## 2019-07-08 RX ORDER — POTASSIUM CHLORIDE 20 MEQ/1
40 TABLET, EXTENDED RELEASE ORAL EVERY 4 HOURS
Status: COMPLETED | OUTPATIENT
Start: 2019-07-08 | End: 2019-07-08

## 2019-07-08 RX ORDER — ONDANSETRON 2 MG/ML
4 INJECTION INTRAMUSCULAR; INTRAVENOUS EVERY 6 HOURS PRN
Status: DISCONTINUED | OUTPATIENT
Start: 2019-07-08 | End: 2019-07-08 | Stop reason: HOSPADM

## 2019-07-08 RX ORDER — DOCUSATE SODIUM 100 MG/1
100 CAPSULE, LIQUID FILLED ORAL 2 TIMES DAILY
Status: DISCONTINUED | OUTPATIENT
Start: 2019-07-08 | End: 2019-07-08

## 2019-07-08 RX ORDER — BUPIVACAINE HYDROCHLORIDE AND EPINEPHRINE 5; 5 MG/ML; UG/ML
INJECTION, SOLUTION EPIDURAL; INTRACAUDAL; PERINEURAL AS NEEDED
Status: DISCONTINUED | OUTPATIENT
Start: 2019-07-08 | End: 2019-07-08

## 2019-07-08 RX ORDER — HYDROMORPHONE HYDROCHLORIDE 1 MG/ML
0.4 INJECTION, SOLUTION INTRAMUSCULAR; INTRAVENOUS; SUBCUTANEOUS EVERY 5 MIN PRN
Status: DISCONTINUED | OUTPATIENT
Start: 2019-07-08 | End: 2019-07-08 | Stop reason: HOSPADM

## 2019-07-08 RX ORDER — MEPERIDINE HYDROCHLORIDE 25 MG/ML
12.5 INJECTION INTRAMUSCULAR; INTRAVENOUS; SUBCUTANEOUS EVERY 5 MIN PRN
Status: DISCONTINUED | OUTPATIENT
Start: 2019-07-08 | End: 2019-07-08 | Stop reason: HOSPADM

## 2019-07-08 RX ORDER — METOCLOPRAMIDE HYDROCHLORIDE 5 MG/ML
10 INJECTION INTRAMUSCULAR; INTRAVENOUS EVERY 6 HOURS PRN
Status: DISCONTINUED | OUTPATIENT
Start: 2019-07-08 | End: 2019-07-08

## 2019-07-08 RX ORDER — KETOROLAC TROMETHAMINE 15 MG/ML
15 INJECTION, SOLUTION INTRAMUSCULAR; INTRAVENOUS EVERY 6 HOURS PRN
Status: DISCONTINUED | OUTPATIENT
Start: 2019-07-08 | End: 2019-07-08

## 2019-07-08 RX ORDER — HEPARIN SODIUM 5000 [USP'U]/ML
5000 INJECTION, SOLUTION INTRAVENOUS; SUBCUTANEOUS EVERY 8 HOURS SCHEDULED
Status: DISCONTINUED | OUTPATIENT
Start: 2019-07-08 | End: 2019-07-08

## 2019-07-08 RX ORDER — NALOXONE HYDROCHLORIDE 0.4 MG/ML
80 INJECTION, SOLUTION INTRAMUSCULAR; INTRAVENOUS; SUBCUTANEOUS AS NEEDED
Status: DISCONTINUED | OUTPATIENT
Start: 2019-07-08 | End: 2019-07-08 | Stop reason: HOSPADM

## 2019-07-08 RX ORDER — KETOROLAC TROMETHAMINE 30 MG/ML
30 INJECTION, SOLUTION INTRAMUSCULAR; INTRAVENOUS EVERY 6 HOURS PRN
Status: DISCONTINUED | OUTPATIENT
Start: 2019-07-08 | End: 2019-07-08

## 2019-07-08 RX ORDER — ATENOLOL 25 MG/1
12.5 TABLET ORAL
Status: DISCONTINUED | OUTPATIENT
Start: 2019-07-08 | End: 2019-07-08

## 2019-07-08 RX ORDER — ALPRAZOLAM 0.25 MG/1
0.25 TABLET ORAL 2 TIMES DAILY PRN
Status: DISCONTINUED | OUTPATIENT
Start: 2019-07-08 | End: 2019-07-08

## 2019-07-08 RX ORDER — POLYETHYLENE GLYCOL 3350 17 G/17G
17 POWDER, FOR SOLUTION ORAL DAILY PRN
Status: DISCONTINUED | OUTPATIENT
Start: 2019-07-08 | End: 2019-07-08

## 2019-07-08 RX ORDER — PANTOPRAZOLE SODIUM 40 MG/1
40 TABLET, DELAYED RELEASE ORAL
Status: DISCONTINUED | OUTPATIENT
Start: 2019-07-08 | End: 2019-07-08

## 2019-07-08 RX ORDER — SODIUM CHLORIDE, SODIUM LACTATE, POTASSIUM CHLORIDE, CALCIUM CHLORIDE 600; 310; 30; 20 MG/100ML; MG/100ML; MG/100ML; MG/100ML
INJECTION, SOLUTION INTRAVENOUS CONTINUOUS
Status: DISCONTINUED | OUTPATIENT
Start: 2019-07-08 | End: 2019-07-08

## 2019-07-08 RX ORDER — ONDANSETRON 2 MG/ML
4 INJECTION INTRAMUSCULAR; INTRAVENOUS EVERY 6 HOURS PRN
Status: DISCONTINUED | OUTPATIENT
Start: 2019-07-08 | End: 2019-07-08

## 2019-07-08 RX ORDER — BISACODYL 10 MG
10 SUPPOSITORY, RECTAL RECTAL
Status: DISCONTINUED | OUTPATIENT
Start: 2019-07-08 | End: 2019-07-08

## 2019-07-08 NOTE — PLAN OF CARE
Patient refusing to take full dose of Potassium, states she is sensitive to medications and only wants to take 1 pill.

## 2019-07-08 NOTE — ED PROVIDER NOTES
Patient Seen in: [facility] Emergency Department    History   Patient presents with:  Abdomen/Flank Pain (GI/)    Stated Complaint: interm abd pain today  denies n/v/d/  bloating+  LBM this am    HPI    The patient is a 60-year-old female presenti LUMPECTOMY LEFT  1/2014    ALH/LCIS   • HILLARY BIOPSY STEREOTACTIC NODULE 2 SITE BILAT  2011    ALH/LCIS   • OTHER SURGICAL HISTORY      Uterine polyp removal   • OTHER SURGICAL HISTORY      Cyst on neck removal           Social History    Tobacco Use      Sm quadrant of her abdomen but no rebound or guarding. No CVA tenderness. Extremities: No deformity, nontender throughout, and normal active range of motion of all 4 extremities.   Distal pulses normal and symmetric  Skin: No masses or nodules or abnormalitie obtained on March 14, 2019. On arrival of the patient an EKG was obtained which showed no acute process. The patient was placed on continuous pulse oximetry and cardiac telemetry. Blood was obtained and peripheral IV access was established. periappendiceal fluid/fat stranding. ABDOMINAL WALL:  Unremarkable. PELVIC ORGANS:  The endometrium appears thickened for a patient of this     age. LYMPH NODES:  Unremarkable. BONES:  Unremarkable.     OTHER:  None.         =====    CONCLUSION:

## 2019-07-08 NOTE — PROGRESS NOTES
[facility]  Progress Note    Zina Cabrera Patient Status:  Inpatient    1960 MRN [id number]   [facility] 3NW-A Attending Jean-Paul Alex MD   Hosp Day # 0 PCP Bubba Apodaca MD     Subjective:  No new complaints, incisional pa routine gynecological exam     Acute appendicitis with localized peritonitis and gangrene, without perforation or abscess     Acute appendicitis    POD 0 Lap Appy    Plan:  1. D/C home today  2. F/U in 1 week  3. Tramadol for pain  4.  No lifting>10 lbs, no

## 2019-07-08 NOTE — PLAN OF CARE
Problem: PAIN - ADULT  Goal: Verbalizes/displays adequate comfort level or patient's stated pain goal  Description  INTERVENTIONS:  - Encourage pt to monitor pain and request assistance  - Assess pain using appropriate pain scale  - Administer analgesics of care  - Consider collaborating with pharmacy to review patient's medication profile  - Implement strategies to promote bladder emptying  Outcome: Progressing     Problem: SKIN/TISSUE INTEGRITY - ADULT  Goal: Incision(s), wounds(s) or drain site(s) heali

## 2019-07-08 NOTE — OPERATIVE REPORT
DATE OF OPERATION:  7/8/2019  PREOPERATIVE DIAGNOSIS: Acute appendicitis  POSTOPERATIVE DIAGNOSIS: Acute appendicitis   PROCEDURE PERFORMED: Laparoscopic appendectomy.    SURGEON:  Shahab Da Silva MD  ANESTHESIA: General.   SPECIMEN: Appendix to Pathol an Endo Catch bag and withdrawn through the 12-mm port site. The abdomen was then copiously irrigated and suctioned until the irrigant returned clear and colorless. The staple line was inspected and found to be intact.  The 12-mm port was then withdrawn und

## 2019-07-08 NOTE — ED INITIAL ASSESSMENT (HPI)
Pt c/o abdominal pain & bloating today that goes across the mid region of abdomen, PT denies nausea, vomiting or diarrhea.  Pt hx cholesectomy

## 2019-07-08 NOTE — PLAN OF CARE
A&Ox4. VSS. Afbrile. Voiding well. Tolerating full liquids this morning. Advanced to regular for lunch. Denies any nausea. Bowel sounds present. Denies flatus. Toradol for pain. Up ad tyrel. IV Sl. Lap x 3 with Ss. POC updated. Call light within reach.     15 development  - Assess and document skin integrity  - Assess and document dressing/incision, wound bed, drain sites and surrounding tissue  - Implement wound care per orders  - Initiate isolation precautions as appropriate  - Initiate Pressure Ulcer prevent

## 2019-07-08 NOTE — ANESTHESIA PREPROCEDURE EVALUATION
PRE-OP EVALUATION    Patient Name: Machelle Ross    Pre-op Diagnosis: Acute appendicitis [K35.80]    Procedure(s):  Laparoscopic Appendectomy    Surgeon(s) and Role:     Vallarie Koyanagi, MD - Primary    Pre-op vitals reviewed.   Temp: 99.1 °F (37.3 °C Neuro/Psych    Negative neuro/psych ROS.    (+) anxiety                      PONV   Panic attacks       Past Surgical History:   Procedure Laterality Date   • BREAST BIOPSY NEEDLE LOCALIZATION Left 1/9/2014    Performed by Migel Louis MD at [facility]

## 2019-07-08 NOTE — H&P
[facility]  History & Physical    Charmayne Sheller Patient Status:  Emergency    1960 MRN [id number]   Location [address] [place] [address] Attending Katherine Doran MD   Hosp Day # 0 PCP Stanley Taylor MD     Reason for Admission:  Acute jaelyn SURGICAL HISTORY      Cyst on neck removal     Family History   Problem Relation Age of Onset   • Cancer Mother    • Breast Cancer Mother 54   • Other (Other) Mother    • Diabetes Sister    • Breast Cancer Self    • LCIS Self       reports that she has nev Negative for ear drainage, hearing loss and nasal drainage  Eyes:  Negative for eye discharge and vision loss  Gastrointestinal: Positive for abdominal pain.   Negative for constipation, decreased appetite, diarrhea and vomiting  Genitourinary:  Negative fo Results   Component Value Date    WBC 12.6 07/07/2019    HGB 15.1 07/07/2019    HCT 44.2 07/07/2019    .0 07/07/2019     No results found for: PT, INR  Lab Results   Component Value Date     07/07/2019    K 3.4 07/07/2019     07/07/2019 with headache     Well woman exam with routine gynecological exam     Acute appendicitis, unspecified acute appendicitis type      Plan: For laparoscopic appendectomy. The risks, benefits and alternatives were discussed with the patient.   Risks included,

## 2019-07-08 NOTE — ANESTHESIA POSTPROCEDURE EVALUATION
[address] [facility] Patient Status:  Emergency   Age/Gender 60year old female MRN [id number]   Location [facility] Attending Donnis Ormond, MD   Hosp Day # 0 PCP Mart Roche MD       Anesthesia Post-op Not

## 2019-07-09 NOTE — PAYOR COMM NOTE
--------------  DISCHARGE REVIEW    Payor: Kristina Deiters EMP PPO. [place] #:  [id number]  Authorization Number: [id number]    To ED 7/7    Admit date: 7/8/19  Admit time:  0304  Discharge Date: 7/8/2019  6:23 PM     Admitting Physician: Jarocho Villalobos Bronchitis     Sinusitis     Near syncope     Dizziness and giddiness     Palpitations     Headache     Anxiety     Hot flashes     Migraine variant with headache     Well woman exam with routine gynecological exam     Acute appendicitis with localized per

## 2019-07-09 NOTE — PAYOR COMM NOTE
--------------  ADMISSION REVIEW     Payor: Robert Duvall  Subscriber #:  [id number]  Authorization Number: [id number]    Admit date: 7/8/19  Admit time: Boo Soriano       Admitting Physician: Melinda Parnell MD  Attending Physician:  Brianna Migraines     SILENT MIGRAINE   • Osteoarthrosis, unspecified whether generalized or localized, unspecified site     BACK   • PONV (postoperative nausea and vomiting)    • Problems with swallowing    • Tinnitus    • Unifocal PVCs     no medication   • Vari throughout, and normal active range of motion of all 4 extremities. Distal pulses normal and symmetric  Skin: No masses or nodules or abnormalities.   Psych: Normal interaction, cooperative with exam         ED Course     Labs Reviewed   COMP METABOLIC PAN process. The patient was placed on continuous pulse oximetry and cardiac telemetry. Blood was obtained and peripheral IV access was established. She was offered IV analgesics but she declined. So she was given a GI cocktail, normal saline.   CT of her a appears thickened for a patient of this     age. LYMPH NODES:  Unremarkable. BONES:  Unremarkable. OTHER:  None.         =====    CONCLUSION:           1. Imaging findings are consistent with acute appendicitis.   No drainable     fluid collection and is crampy in nature. She attributed the pain to eating higher fat diet over the holiday weekend. She took some medications, and the pain seemed to dissipate. The pain then returned. She took simethicone, without relief.   The pain continued to escal lethargy  Endocrine:  Negative for abnormal sleep patterns, increased activity, polydipsia and polyphagia  ENMT:  Negative for ear drainage, hearing loss and nasal drainage  Eyes:  Negative for eye discharge and vision loss  Gastrointestinal: Positive for Motor strength and sensory examination is grossly normal.  No focal neurologic deficit.     Laboratory Data:  Lab Results   Component Value Date    WBC 12.6 07/07/2019    HGB 15.1 07/07/2019    HCT 44.2 07/07/2019    .0 07/07/2019     No results foun syncope     Dizziness and giddiness     Palpitations     Headache     Anxiety     Hot flashes     Migraine variant with headache     Well woman exam with routine gynecological exam     Acute appendicitis, unspecified acute appendicitis type      Plan:   For General anesthesia was induced, a Jovel catheter was placed, and the abdomen was prepped and draped in usual sterile fashion. The umbilicus was inverted, and an incision was made inferior to the umbilicus through her prior scar with an 11-blade scalpel.  A  2:16 AM

## 2019-07-16 ENCOUNTER — OFFICE VISIT (OUTPATIENT)
Dept: SURGERY | Facility: CLINIC | Age: 59
End: 2019-07-16

## 2019-07-16 VITALS
WEIGHT: 190 LBS | HEIGHT: 62 IN | SYSTOLIC BLOOD PRESSURE: 122 MMHG | DIASTOLIC BLOOD PRESSURE: 78 MMHG | HEART RATE: 65 BPM | BODY MASS INDEX: 34.96 KG/M2 | TEMPERATURE: 98 F

## 2019-07-16 DIAGNOSIS — K35.31 ACUTE APPENDICITIS WITH LOCALIZED PERITONITIS AND GANGRENE, WITHOUT PERFORATION OR ABSCESS: Primary | ICD-10-CM

## 2019-07-16 DIAGNOSIS — N30.00 ACUTE CYSTITIS WITHOUT HEMATURIA: ICD-10-CM

## 2019-07-16 PROCEDURE — 99024 POSTOP FOLLOW-UP VISIT: CPT | Performed by: PHYSICIAN ASSISTANT

## 2019-07-16 RX ORDER — SULFAMETHOXAZOLE AND TRIMETHOPRIM 800; 160 MG/1; MG/1
1 TABLET ORAL 2 TIMES DAILY
Qty: 6 TABLET | Refills: 0 | Status: SHIPPED | OUTPATIENT
Start: 2019-07-16 | End: 2019-07-19

## 2019-07-16 RX ORDER — MULTIVITAMIN WITH IRON
250 TABLET ORAL DAILY
COMMUNITY
Start: 2014-07-29

## 2019-07-16 NOTE — PROGRESS NOTES
Post Operative Visit Note       Active Problems  1. Acute appendicitis with localized peritonitis and gangrene, without perforation or abscess    2.  Acute cystitis without hematuria         Chief Complaint   Patient presents with:  Post-Op: 7/8 nikki tucker COLONOSCOPY N/A 7/9/2018    Performed by Jag Gaffney MD at [facility] ENDOSCOPY   • ESOPHAGOGASTRODUODENOSCOPY (EGD) N/A 7/9/2018    Performed by Jag Gaffney MD at [address] 7/8/2019    Performed by Giancarlo Webster Multiple Vitamin (TAB-A-JALEN) Oral Tab, Take 1 tablet by mouth daily. , Disp: , Rfl:   •  Cholecalciferol (VITAMIN D) 1000 UNITS Oral Cap, Take 1,000 mg by mouth., Disp: , Rfl:   •  traMADol HCl 50 MG Oral Tab, Take 1 tablet (50 mg total) by mouth every 6 murmur heard. Pulmonary/Chest: Effort normal and breath sounds normal. No respiratory distress. She has no wheezes. She has no rales. Abdominal: Soft. Bowel sounds are normal. She exhibits no distension. There is no tenderness.  There is no rebound and n

## 2019-07-19 ENCOUNTER — TELEPHONE (OUTPATIENT)
Dept: SURGERY | Facility: CLINIC | Age: 59
End: 2019-07-19

## 2019-07-19 NOTE — TELEPHONE ENCOUNTER
Per Angle WILLS patient has tolerated Cipro in the past and aware of symptoms to watch for and will call the office issues. [facility]

## 2019-07-25 ENCOUNTER — HOSPITAL ENCOUNTER (OUTPATIENT)
Age: 59
Discharge: HOME | End: 2019-07-25
Attending: FAMILY MEDICINE
Payer: COMMERCIAL

## 2019-07-25 VITALS
WEIGHT: 189 LBS | OXYGEN SATURATION: 98 % | RESPIRATION RATE: 18 BRPM | HEART RATE: 63 BPM | BODY MASS INDEX: 30.37 KG/M2 | DIASTOLIC BLOOD PRESSURE: 67 MMHG | HEIGHT: 66 IN | SYSTOLIC BLOOD PRESSURE: 124 MMHG | TEMPERATURE: 99 F

## 2019-07-25 DIAGNOSIS — T78.40XA ALLERGIC REACTION TO DRUG, INITIAL ENCOUNTER: Primary | ICD-10-CM

## 2019-07-25 DIAGNOSIS — R82.90 BAD ODOR OF URINE: ICD-10-CM

## 2019-07-25 LAB
POCT BILIRUBIN URINE: NEGATIVE
POCT BLOOD URINE: NEGATIVE
POCT GLUCOSE URINE: NEGATIVE MG/DL
POCT KETONE URINE: NEGATIVE MG/DL
POCT NITRITE URINE: NEGATIVE
POCT PH URINE: 5 (ref 5–8)
POCT PROTEIN URINE: NEGATIVE MG/DL
POCT SPECIFIC GRAVITY URINE: 1.03
POCT URINE COLOR: YELLOW
POCT UROBILINOGEN URINE: 0.2 MG/DL

## 2019-07-25 PROCEDURE — 99214 OFFICE O/P EST MOD 30 MIN: CPT

## 2019-07-25 PROCEDURE — 81002 URINALYSIS NONAUTO W/O SCOPE: CPT | Performed by: FAMILY MEDICINE

## 2019-07-25 PROCEDURE — 87086 URINE CULTURE/COLONY COUNT: CPT | Performed by: FAMILY MEDICINE

## 2019-07-25 PROCEDURE — 93010 ELECTROCARDIOGRAM REPORT: CPT | Performed by: INTERNAL MEDICINE

## 2019-07-25 PROCEDURE — 99204 OFFICE O/P NEW MOD 45 MIN: CPT

## 2019-07-25 PROCEDURE — 93010 ELECTROCARDIOGRAM REPORT: CPT

## 2019-07-25 PROCEDURE — 93005 ELECTROCARDIOGRAM TRACING: CPT

## 2019-07-25 PROCEDURE — S0028 INJECTION, FAMOTIDINE, 20 MG: HCPCS

## 2019-07-25 PROCEDURE — 96375 TX/PRO/DX INJ NEW DRUG ADDON: CPT

## 2019-07-25 PROCEDURE — 96374 THER/PROPH/DIAG INJ IV PUSH: CPT

## 2019-07-25 RX ORDER — DIPHENHYDRAMINE HYDROCHLORIDE 50 MG/ML
25 INJECTION INTRAMUSCULAR; INTRAVENOUS ONCE
Status: COMPLETED | OUTPATIENT
Start: 2019-07-25 | End: 2019-07-25

## 2019-07-25 RX ORDER — METHYLPREDNISOLONE SODIUM SUCCINATE 125 MG/2ML
125 INJECTION, POWDER, LYOPHILIZED, FOR SOLUTION INTRAMUSCULAR; INTRAVENOUS ONCE
Status: DISCONTINUED | OUTPATIENT
Start: 2019-07-25 | End: 2019-07-25

## 2019-07-25 RX ORDER — FAMOTIDINE 10 MG/ML
20 INJECTION, SOLUTION INTRAVENOUS ONCE
Status: COMPLETED | OUTPATIENT
Start: 2019-07-25 | End: 2019-07-25

## 2019-07-25 NOTE — ED INITIAL ASSESSMENT (HPI)
Pt states that she was put on Cipro for a UTI and started it yesterday. Pt states that today she took the tablet at 4pm and about one hour later she started feeling that her throat did not feel right.  Pt states that she took xanax to see if it would help b

## 2019-07-26 LAB
ATRIAL RATE: 63 BPM
P AXIS: 75 DEGREES
P-R INTERVAL: 130 MS
Q-T INTERVAL: 418 MS
QRS DURATION: 88 MS
QTC CALCULATION (BEZET): 427 MS
R AXIS: -6 DEGREES
T AXIS: 3 DEGREES
VENTRICULAR RATE: 63 BPM

## 2019-07-26 NOTE — ED PROVIDER NOTES
Patient Seen in: [address] [facility]    History   Patient presents with:   Allergic Rxn Allergies (immune)    Stated Complaint: possible allergic reaction to Rx x 2 days    HPI    *26-year-old female presents to the immediate car Laterality Date   • APPENDECTOMY  07/08/2019   • BREAST BIOPSY NEEDLE LOCALIZATION Left 1/9/2014    Performed by Keila Vazquez MD at [facility] MAIN OR   • CHOLECYSTECTOMY      2004   • COLONOSCOPY N/A 7/9/2018    Performed by Ivon Price MD at [facility] ENDOSCOPY gums normalNo lip, tongue, throat swelling. Neck: no adenopathy and supple, symmetrical, trachea midline  Back: symmetric, no curvature. ROM normal. No CVA tenderness. Lungs: clear to auscultation bilaterally.  No wheezing, rhonchi or crackles   Chest wal IV x1, Benadryl 25 mg IV x1. I was planning to give her Solu-Medrol 125 mg IV x1 however patient declined stating that steroids usually triggered her anxiety. Patient was monitored for the next 30 minutes.   Upon reevaluation she states her symptoms have

## 2019-07-28 ENCOUNTER — APPOINTMENT (OUTPATIENT)
Dept: CT IMAGING | Facility: HOSPITAL | Age: 59
End: 2019-07-28
Attending: EMERGENCY MEDICINE
Payer: COMMERCIAL

## 2019-07-28 ENCOUNTER — HOSPITAL ENCOUNTER (EMERGENCY)
Facility: HOSPITAL | Age: 59
Discharge: HOME | End: 2019-07-29
Attending: EMERGENCY MEDICINE
Payer: COMMERCIAL

## 2019-07-28 ENCOUNTER — APPOINTMENT (OUTPATIENT)
Dept: GENERAL RADIOLOGY | Facility: HOSPITAL | Age: 59
End: 2019-07-28
Attending: EMERGENCY MEDICINE
Payer: COMMERCIAL

## 2019-07-28 DIAGNOSIS — R06.00 DYSPNEA, UNSPECIFIED TYPE: Primary | ICD-10-CM

## 2019-07-28 LAB
ALBUMIN SERPL-MCNC: 3.8 G/DL (ref 3.4–5)
ALBUMIN/GLOB SERPL: 1 {RATIO} (ref 1–2)
ALP LIVER SERPL-CCNC: 70 U/L (ref 46–118)
ALT SERPL-CCNC: 20 U/L (ref 13–56)
ANION GAP SERPL CALC-SCNC: 8 MMOL/L (ref 0–18)
AST SERPL-CCNC: 14 U/L (ref 15–37)
BASOPHILS # BLD AUTO: 0.05 X10(3) UL (ref 0–0.2)
BASOPHILS NFR BLD AUTO: 0.6 %
BILIRUB SERPL-MCNC: 0.6 MG/DL (ref 0.1–2)
BUN BLD-MCNC: 13 MG/DL (ref 7–18)
BUN/CREAT SERPL: 15.7 (ref 10–20)
CALCIUM BLD-MCNC: 9.3 MG/DL (ref 8.5–10.1)
CHLORIDE SERPL-SCNC: 109 MMOL/L (ref 98–112)
CO2 SERPL-SCNC: 25 MMOL/L (ref 21–32)
CREAT BLD-MCNC: 0.83 MG/DL (ref 0.55–1.02)
D-DIMER: 0.63 UG/ML FEU (ref ?–0.6)
DEPRECATED RDW RBC AUTO: 40.1 FL (ref 35.1–46.3)
EOSINOPHIL # BLD AUTO: 0.05 X10(3) UL (ref 0–0.7)
EOSINOPHIL NFR BLD AUTO: 0.6 %
ERYTHROCYTE [DISTWIDTH] IN BLOOD BY AUTOMATED COUNT: 12.6 % (ref 11–15)
GLOBULIN PLAS-MCNC: 3.8 G/DL (ref 2.8–4.4)
GLUCOSE BLD-MCNC: 105 MG/DL (ref 70–99)
HCT VFR BLD AUTO: 39.1 % (ref 35–48)
HGB BLD-MCNC: 13.7 G/DL (ref 12–16)
IMM GRANULOCYTES # BLD AUTO: 0.01 X10(3) UL (ref 0–1)
IMM GRANULOCYTES NFR BLD: 0.1 %
LYMPHOCYTES # BLD AUTO: 2.86 X10(3) UL (ref 1–4)
LYMPHOCYTES NFR BLD AUTO: 35.2 %
M PROTEIN MFR SERPL ELPH: 7.6 G/DL (ref 6.4–8.2)
MCH RBC QN AUTO: 30.7 PG (ref 26–34)
MCHC RBC AUTO-ENTMCNC: 35 G/DL (ref 31–37)
MCV RBC AUTO: 87.7 FL (ref 80–100)
MONOCYTES # BLD AUTO: 0.86 X10(3) UL (ref 0.1–1)
MONOCYTES NFR BLD AUTO: 10.6 %
NEUTROPHILS # BLD AUTO: 4.29 X10 (3) UL (ref 1.5–7.7)
NEUTROPHILS # BLD AUTO: 4.29 X10(3) UL (ref 1.5–7.7)
NEUTROPHILS NFR BLD AUTO: 52.9 %
OSMOLALITY SERPL CALC.SUM OF ELEC: 294 MOSM/KG (ref 275–295)
PLATELET # BLD AUTO: 260 10(3)UL (ref 150–450)
POTASSIUM SERPL-SCNC: 3.2 MMOL/L (ref 3.5–5.1)
RBC # BLD AUTO: 4.46 X10(6)UL (ref 3.8–5.3)
SODIUM SERPL-SCNC: 142 MMOL/L (ref 136–145)
TROPONIN I SERPL-MCNC: <0.045 NG/ML (ref ?–0.04)
WBC # BLD AUTO: 8.1 X10(3) UL (ref 4–11)

## 2019-07-28 PROCEDURE — 99285 EMERGENCY DEPT VISIT HI MDM: CPT

## 2019-07-28 PROCEDURE — 71045 X-RAY EXAM CHEST 1 VIEW: CPT | Performed by: EMERGENCY MEDICINE

## 2019-07-28 PROCEDURE — 93005 ELECTROCARDIOGRAM TRACING: CPT

## 2019-07-28 PROCEDURE — 99284 EMERGENCY DEPT VISIT MOD MDM: CPT

## 2019-07-28 PROCEDURE — 84484 ASSAY OF TROPONIN QUANT: CPT | Performed by: EMERGENCY MEDICINE

## 2019-07-28 PROCEDURE — 85378 FIBRIN DEGRADE SEMIQUANT: CPT | Performed by: EMERGENCY MEDICINE

## 2019-07-28 PROCEDURE — 93010 ELECTROCARDIOGRAM REPORT: CPT

## 2019-07-28 PROCEDURE — 85025 COMPLETE CBC W/AUTO DIFF WBC: CPT | Performed by: EMERGENCY MEDICINE

## 2019-07-28 PROCEDURE — 36415 COLL VENOUS BLD VENIPUNCTURE: CPT

## 2019-07-28 PROCEDURE — 80053 COMPREHEN METABOLIC PANEL: CPT | Performed by: EMERGENCY MEDICINE

## 2019-07-28 PROCEDURE — 71275 CT ANGIOGRAPHY CHEST: CPT | Performed by: EMERGENCY MEDICINE

## 2019-07-28 RX ORDER — POTASSIUM CHLORIDE 20 MEQ/1
40 TABLET, EXTENDED RELEASE ORAL ONCE
Status: COMPLETED | OUTPATIENT
Start: 2019-07-28 | End: 2019-07-28

## 2019-07-29 VITALS
HEIGHT: 62 IN | HEART RATE: 67 BPM | TEMPERATURE: 99 F | SYSTOLIC BLOOD PRESSURE: 134 MMHG | OXYGEN SATURATION: 99 % | RESPIRATION RATE: 18 BRPM | DIASTOLIC BLOOD PRESSURE: 80 MMHG | WEIGHT: 188 LBS | BODY MASS INDEX: 34.6 KG/M2

## 2019-07-29 LAB
ATRIAL RATE: 79 BPM
P AXIS: 76 DEGREES
P-R INTERVAL: 144 MS
Q-T INTERVAL: 404 MS
QRS DURATION: 88 MS
QTC CALCULATION (BEZET): 463 MS
R AXIS: 4 DEGREES
T AXIS: 24 DEGREES
VENTRICULAR RATE: 79 BPM

## 2019-07-29 NOTE — ED NOTES
Patient sts if she takes 2 pills for K it will go too high. Patient only wanted to take one right now. MD notified.

## 2019-07-29 NOTE — ED PROVIDER NOTES
Patient Seen in: [facility] Emergency Department    History   Patient presents with:  Dyspnea DEVIN SOB (respiratory)    Stated Complaint: shortness of breath, no cough/wheezing    HPI    79-year-old female with a previous history of SVT, history of a ALH/LCIS   • OTHER SURGICAL HISTORY      Uterine polyp removal   • OTHER SURGICAL HISTORY      Cyst on neck removal           Social History    Tobacco Use      Smoking status: Never Smoker      Smokeless tobacco: Never Used    Alcohol use: No      Alcohol components:       Result Value    Glucose 105 (*)     Potassium 3.2 (*)     AST 14 (*)     All other components within normal limits   D-DIMER - Abnormal; Notable for the following components:    D-Dimer 0.63 (*)     All other components within normal limi of the patient's tests results were discussed with the patient in detail. They were understanding of these results and their discharge instructions. All questions were answered. MDM   Patient was screened and evaluated during this visit.    As a treati

## 2019-07-29 NOTE — ED INITIAL ASSESSMENT (HPI)
Pt reports feeling short of breath this morning, and a history of anxiety. She took xanax this AM, which seemed to help with the shortness of breath. She took a nap and awoke short of breath. She took another xanax, but she felt her symptoms worsening.  Kandace Marcial

## 2019-08-29 ENCOUNTER — HOSPITAL ENCOUNTER (OUTPATIENT)
Dept: MAMMOGRAPHY | Facility: HOSPITAL | Age: 59
Discharge: HOME | End: 2019-08-29
Attending: SURGERY
Payer: COMMERCIAL

## 2019-08-29 DIAGNOSIS — Z85.3 HISTORY OF BREAST CANCER: ICD-10-CM

## 2019-08-29 PROCEDURE — 77066 DX MAMMO INCL CAD BI: CPT | Performed by: SURGERY

## 2019-08-29 PROCEDURE — 77062 BREAST TOMOSYNTHESIS BI: CPT | Performed by: SURGERY

## 2019-09-11 ENCOUNTER — OFFICE VISIT (OUTPATIENT)
Dept: CARDIOLOGY | Age: 59
End: 2019-09-11

## 2019-09-11 VITALS
BODY MASS INDEX: 34.23 KG/M2 | WEIGHT: 186 LBS | SYSTOLIC BLOOD PRESSURE: 130 MMHG | HEART RATE: 60 BPM | HEIGHT: 62 IN | DIASTOLIC BLOOD PRESSURE: 66 MMHG

## 2019-09-11 DIAGNOSIS — R00.2 PALPITATIONS: Primary | ICD-10-CM

## 2019-09-11 DIAGNOSIS — I49.3 PVC'S (PREMATURE VENTRICULAR CONTRACTIONS): ICD-10-CM

## 2019-09-11 PROCEDURE — 99213 OFFICE O/P EST LOW 20 MIN: CPT | Performed by: INTERNAL MEDICINE

## 2019-09-11 RX ORDER — ATENOLOL 25 MG/1
12.5 TABLET ORAL DAILY
Qty: 90 TABLET | Refills: 1 | Status: SHIPPED | OUTPATIENT
Start: 2019-09-11 | End: 2020-03-09 | Stop reason: SDUPTHER

## 2019-09-11 RX ORDER — ATENOLOL 25 MG/1
12.5 TABLET ORAL DAILY
COMMUNITY
End: 2019-09-11 | Stop reason: SDUPTHER

## 2019-09-11 RX ORDER — CALCIUM CARBONATE 260MG(650)
TABLET,CHEWABLE ORAL
COMMUNITY

## 2019-09-11 RX ORDER — PAROXETINE 10 MG/1
10 TABLET, FILM COATED ORAL EVERY MORNING
COMMUNITY
End: 2020-12-22 | Stop reason: CLARIF

## 2019-09-11 SDOH — HEALTH STABILITY: MENTAL HEALTH: HOW OFTEN DO YOU HAVE A DRINK CONTAINING ALCOHOL?: NEVER

## 2019-09-11 ASSESSMENT — PATIENT HEALTH QUESTIONNAIRE - PHQ9
2. FEELING DOWN, DEPRESSED OR HOPELESS: NOT AT ALL
SUM OF ALL RESPONSES TO PHQ9 QUESTIONS 1 AND 2: 0
SUM OF ALL RESPONSES TO PHQ9 QUESTIONS 1 AND 2: 0
1. LITTLE INTEREST OR PLEASURE IN DOING THINGS: NOT AT ALL

## 2019-09-21 NOTE — ED PROVIDER NOTES
Patient Seen in: [facility] Emergency Department      History   Patient presents with: Anxiety/Panic attack (neurologic)    Stated Complaint: panic attack    HPI    44-year-old female complaining of panic attack.   The patient states that her son wh Social History    Tobacco Use      Smoking status: Never Smoker      Smokeless tobacco: Never Used    Alcohol use: No      Alcohol/week: 0.0 standard drinks    Drug use:  No             Review of Systems    Positive for stated complaint: panic att Rhythm  Reading: Moderate voltage criteria for LVH borderline EKG              Patient's labs are unremarkable. MDM     Patient was given 1 mg Ativan IV and observed by our labs are unremarkable she is feeling much better at the time of discharge.

## 2019-09-21 NOTE — ED INITIAL ASSESSMENT (HPI)
Pt had argument with son this evening which triggered a panic attack. Pt states she is usually able to calm herself down but was unable to this evening. Pt admits to dizziness as well.

## 2019-09-28 ENCOUNTER — HOSPITAL ENCOUNTER (OUTPATIENT)
Dept: CT IMAGING | Facility: HOSPITAL | Age: 59
Discharge: HOME | End: 2019-09-28
Attending: FAMILY MEDICINE

## 2019-09-28 DIAGNOSIS — Z13.6 SCREENING FOR CARDIOVASCULAR CONDITION: ICD-10-CM

## 2019-09-28 NOTE — PROGRESS NOTES
Pt seen for [facility]  PRELIMINARY SCORE=0  AT=830/74  Cholestec labs as follows:  PJ=880  HDL=42  IXX=857  GH=622  GLUCOSE=91 nonfasting  Encouraged exercise program  All results and risk factors discussed with patient; all questions and concerns addressed.   E

## 2019-11-07 ENCOUNTER — OFFICE VISIT (OUTPATIENT)
Dept: NEUROLOGY | Facility: CLINIC | Age: 59
End: 2019-11-07
Payer: COMMERCIAL

## 2019-11-07 VITALS
BODY MASS INDEX: 35 KG/M2 | TEMPERATURE: 99 F | WEIGHT: 188.75 LBS | DIASTOLIC BLOOD PRESSURE: 64 MMHG | SYSTOLIC BLOOD PRESSURE: 122 MMHG

## 2019-11-07 DIAGNOSIS — R42 PERSISTENT POSTURAL-PERCEPTUAL DIZZINESS: ICD-10-CM

## 2019-11-07 DIAGNOSIS — H53.9 VISUAL AURA: ICD-10-CM

## 2019-11-07 DIAGNOSIS — R26.89 IMBALANCE: ICD-10-CM

## 2019-11-07 PROCEDURE — 99204 OFFICE O/P NEW MOD 45 MIN: CPT | Performed by: OTHER

## 2019-11-07 NOTE — PROGRESS NOTES
HPI:    Patient ID: Paul Dominguez is a 60year old female. HPI   Giancarlo Trinidad is a pleasant 60year old female with history of migraine variant, anxiety, SVT, chronic dizziness who presents for evaluation of dizziness and off balance feeling.  She had pre Relation Age of Onset   • Cancer Mother    • Breast Cancer Mother 54   • Other (Other) Mother    • Diabetes Sister    • Breast Cancer Self    • LCIS Self       Social History    Tobacco Use      Smoking status: Never Smoker      Smokeless tobacco: Never Us PHYSICAL EXAM:   Physical Exam  Blood pressure 122/64, temperature 98.6 °F (37 °C), temperature source Oral, weight 188 lb 12 oz (85.6 kg), last menstrual period 01/01/2011, not currently breastfeeding.     Vitals reviewed  General: well developed, well n this encounter.       Meds This Visit:  Requested Prescriptions      No prescriptions requested or ordered in this encounter       Imaging & Referrals:  None       SO#[id number]

## 2019-11-18 ENCOUNTER — OFFICE VISIT (OUTPATIENT)
Dept: SURGERY | Facility: CLINIC | Age: 59
End: 2019-11-18
Payer: COMMERCIAL

## 2019-11-18 VITALS
DIASTOLIC BLOOD PRESSURE: 85 MMHG | HEART RATE: 73 BPM | HEIGHT: 62 IN | BODY MASS INDEX: 34.6 KG/M2 | SYSTOLIC BLOOD PRESSURE: 128 MMHG | TEMPERATURE: 97 F | WEIGHT: 188 LBS

## 2019-11-18 DIAGNOSIS — L72.9 SCALP CYST: Primary | ICD-10-CM

## 2019-11-18 PROCEDURE — 99242 OFF/OP CONSLTJ NEW/EST SF 20: CPT | Performed by: SURGERY

## 2019-11-18 NOTE — PROGRESS NOTES
Follow Up Visit Note       Active Problems      1. Scalp cyst          Chief Complaint   Patient presents with:  Cyst: Est Pt / New Problem - Scalp Cysts; States has had prevous scalp cysts removed, but they came back.  States they open up even when she is Date   • Anxiety    • Anxiety state    • Arrhythmia     PVC   • Esophageal reflux    • Migraines     SILENT MIGRAINE   • Osteoarthrosis, unspecified whether generalized or localized, unspecified site     BACK   • PONV (postoperative nausea and vomiting) 250 mg by mouth 2 (two) times daily as needed. , Disp: , Rfl:   •  Meclizine HCl 25 MG Oral Tab, Take 1 tablet (25 mg total) by mouth 3 (three) times daily as needed. , Disp: 20 tablet, Rfl: 0  •  ALPRAZolam 0.5 MG Oral Tab, Take 0.25 mg by mouth as needed. Physical Exam   Constitutional: She is oriented to person, place, and time. She appears well-developed and well-nourished. No distress. HENT:   Head: Normocephalic and atraumatic.        1.5 cm area of fullness with central point of purulent drainage wi

## 2019-11-26 ENCOUNTER — TELEPHONE (OUTPATIENT)
Dept: SURGERY | Facility: CLINIC | Age: 59
End: 2019-11-26

## 2019-11-26 DIAGNOSIS — L72.9 SCALP CYST: Primary | ICD-10-CM

## 2019-11-29 ENCOUNTER — TELEPHONE (OUTPATIENT)
Dept: PHYSICAL THERAPY | Age: 59
End: 2019-11-29

## 2019-12-02 ENCOUNTER — OFFICE VISIT (OUTPATIENT)
Dept: PHYSICAL THERAPY | Age: 59
End: 2019-12-02
Attending: Other
Payer: COMMERCIAL

## 2019-12-02 DIAGNOSIS — R42 PERSISTENT POSTURAL-PERCEPTUAL DIZZINESS: ICD-10-CM

## 2019-12-02 DIAGNOSIS — R26.89 IMBALANCE: ICD-10-CM

## 2019-12-02 PROCEDURE — 97162 PT EVAL MOD COMPLEX 30 MIN: CPT

## 2019-12-02 PROCEDURE — 97112 NEUROMUSCULAR REEDUCATION: CPT

## 2019-12-02 NOTE — PROGRESS NOTES
VESTIBULAR EVALUATION:   Referring Physician: Dr. Uriah Villafana  Diagnosis:  Imbalance (R26.89)  Persistent postural-perceptual dizziness (R42)     Date of Service: 12/2/2019     PATIENT SUMMARY   Yen May is a 60year old female who presents to therapy (supraventricular tachycardia) ([facility] [address] Tinnitus, Unifocal PVCs, and Varicose vein.  She also has no past medical history of Breast CA ([facility] [address] Cancer ([facility] [address] Difficult intubation, Malignant hyperthermia, Other specified diseases of blood and blood-forming organ Romberg: EO >30 sec, EC >30 sec   Romberg on Foam: EO >30 sec, EC >30 sec   Tandem Stance: R back EO >30 sec, EC 2 sec; L back EO 12 sec, EC 2 sec   SLS: R EO 15 sec ; L EO 12 sec     Functional Mobility:   Gait: pt ambulates on level ground with normal total of 10 visits over a 90 day period. Treatment will include: home exercise program development and instruction, patient/family education, balance training, eye/head coordination exercises, ROM and gait training.      Education or treatment limitation: N

## 2019-12-03 NOTE — PATIENT INSTRUCTIONS
Eye head movements:  Fast  1) Still object at periphery-- look and focus on paper to right, then to left. You will need to move both your head and eyes.     A) left/right   B) up/down    2) Moving Object in Phase-- Hold paper in your hand, as you move

## 2019-12-06 ENCOUNTER — APPOINTMENT (OUTPATIENT)
Dept: CARDIOLOGY | Age: 59
End: 2019-12-06

## 2019-12-09 ENCOUNTER — OFFICE VISIT (OUTPATIENT)
Dept: PHYSICAL THERAPY | Age: 59
End: 2019-12-09
Attending: Other
Payer: COMMERCIAL

## 2019-12-09 PROCEDURE — 97112 NEUROMUSCULAR REEDUCATION: CPT

## 2019-12-10 NOTE — PROGRESS NOTES
Dx: Imbalance (R26.89)  Persistent postural-perceptual dizziness (R42         Insurance (Authorized # of Visits):  PPO           Authorizing Physician: Dr. Jed Robert  Next MD visit: none scheduled  Fall Risk: standard         Precautions: n/a             Wu Impairment: Cannot walk 20’ without assistance, severe gait deviations or imbalance. 2. Change in gait speed: 2  Grading: Jeff the lowest category that applies.   (3)  Normal: Able to smoothly change walking speed without loss of balance or gait deviati task with severe disruption of gait, i.e., staggers outside 15” path, loses balance, stops, reaches for wall. Assessment: Completed todays treatment without c/o increased dizziness post treatment.  Posture control exam completed and high risk for fal

## 2019-12-16 ENCOUNTER — OFFICE VISIT (OUTPATIENT)
Dept: PHYSICAL THERAPY | Age: 59
End: 2019-12-16
Attending: Other
Payer: COMMERCIAL

## 2019-12-16 PROCEDURE — 97112 NEUROMUSCULAR REEDUCATION: CPT

## 2019-12-17 NOTE — PROGRESS NOTES
Dx: Imbalance (R26.89)  Persistent postural-perceptual dizziness (R42         Insurance (Authorized # of Visits):  PPO           Authorizing Physician: Dr. Frieda Melchor  Next MD visit: none scheduled  Fall Risk: standard         Precautions: n/a             Wu ea side/side, up/down      Sitting VOR x 2   Up/down, side/side   x1 min ea Seated: scanning post its on wall (saccades) x10      standing wide ALEM: weight shifting PA/PA, M/L 2 x10 ea Standing narrow ALEM:   Hands on hip: count 10 EO/EC  Weight shifts AP/P

## 2019-12-23 ENCOUNTER — OFFICE VISIT (OUTPATIENT)
Dept: PHYSICAL THERAPY | Age: 59
End: 2019-12-23
Attending: Other
Payer: COMMERCIAL

## 2019-12-23 PROCEDURE — 97112 NEUROMUSCULAR REEDUCATION: CPT

## 2019-12-24 NOTE — PROGRESS NOTES
Dx: Imbalance (R26.89)  Persistent postural-perceptual dizziness (R42         Insurance (Authorized # of Visits):  PPO           Authorizing Physician: Dr. Geoffrey Espinoza  Next MD visit: none scheduled  Fall Risk: standard         Precautions: n/a             Wu Hkly: deep breathing 5 breaths x 3 rounds    Hkly: deep breathing 5 breaths x 3 rounds     Seated on EOB with feet on floor: eyes closed   30 sec counts x 5 for grounding  seated on EOB with feet on floor: eyes closed   30 sec counts x 5 for grounding  sea

## 2019-12-24 NOTE — PATIENT INSTRUCTIONS
Head-Trunk Movements: Sitting  A) bend forward and  object from toward the floor. Follow the object up and overhead with your head and eyes.    B) bend forward and  object from toward the floor, bring the object from outside of your foot to

## 2019-12-27 ENCOUNTER — OFFICE VISIT (OUTPATIENT)
Dept: FAMILY MEDICINE CLINIC | Facility: CLINIC | Age: 59
End: 2019-12-27
Payer: COMMERCIAL

## 2019-12-27 VITALS
WEIGHT: 191 LBS | BODY MASS INDEX: 35.15 KG/M2 | DIASTOLIC BLOOD PRESSURE: 72 MMHG | OXYGEN SATURATION: 98 % | SYSTOLIC BLOOD PRESSURE: 124 MMHG | HEART RATE: 68 BPM | HEIGHT: 62 IN | TEMPERATURE: 98 F | RESPIRATION RATE: 22 BRPM

## 2019-12-27 DIAGNOSIS — J01.00 ACUTE NON-RECURRENT MAXILLARY SINUSITIS: Primary | ICD-10-CM

## 2019-12-27 PROCEDURE — 99213 OFFICE O/P EST LOW 20 MIN: CPT | Performed by: FAMILY MEDICINE

## 2019-12-27 RX ORDER — PAROXETINE HYDROCHLORIDE 20 MG/1
TABLET, FILM COATED ORAL EVERY EVENING
Refills: 0 | COMMUNITY
Start: 2019-11-05 | End: 2020-12-29

## 2019-12-27 RX ORDER — AMOXICILLIN AND CLAVULANATE POTASSIUM 875; 125 MG/1; MG/1
1 TABLET, FILM COATED ORAL 2 TIMES DAILY
Qty: 20 TABLET | Refills: 0 | Status: SHIPPED | OUTPATIENT
Start: 2019-12-27 | End: 2020-01-06

## 2019-12-27 NOTE — PROGRESS NOTES
CHIEF COMPLAINT:   Patient presents with:  Nasal Congestion: Wet cough, post nasal drip, headaches, sinus pressure, X 1-2 weeks      HPI:   Walter Young is a 60year old female who presents for sinus congestion and runny nose for 3-4 weeks.  Last week with (postoperative nausea and vomiting)    • Problems with swallowing    • SVT (supraventricular tachycardia) (HCC)    • Tinnitus    • Unifocal PVCs     no medication   • Varicose vein       Past Surgical History:   Procedure Laterality Date   • APPENDECTOMY palpation of maxillary sinuses  EYES: conjunctiva clear, EOM intact  EARS: TM's pearly, no bulging, no retraction, no fluid, bony landmarks present  NOSE: nostrils patent, thick, cloudy nasal mucous, nasal mucosa reddened and boggy  THROAT: oral mucosa pin

## 2019-12-27 NOTE — PATIENT INSTRUCTIONS
Take antibiotics with food and plenty of water. Eat yogurt or take probiotic daily. (Migel Savagee is a good example of an OTC probiotic)  Make sure to finish the entire antibiotic treatment.   Increase fluids and rest.     Use OTC meds for comfort as needed--  U

## 2019-12-30 ENCOUNTER — OFFICE VISIT (OUTPATIENT)
Dept: PHYSICAL THERAPY | Age: 59
End: 2019-12-30
Attending: Other
Payer: COMMERCIAL

## 2019-12-30 PROCEDURE — 97112 NEUROMUSCULAR REEDUCATION: CPT

## 2019-12-31 NOTE — PROGRESS NOTES
Dx: Imbalance (R26.89)  Persistent postural-perceptual dizziness (R42         Insurance (Authorized # of Visits):  PPO           Authorizing Physician: Dr. Meagan Borges MD visit: none scheduled  Fall Risk: standard         Precautions: n/a             Wu with hands into thighs 20 x 5 sec     Hkly: deep breathing 10 reps Hkly: deep breathing 5 breaths x 3 rounds    Hkly: deep breathing 5 breaths x 3 rounds  Hkly: deep breathing 5 breaths x 3 rounds    Seated on EOB with feet on floor: eyes closed   30 sec c

## 2019-12-31 NOTE — PATIENT INSTRUCTIONS
Sit in sturdy chair with handrests  placed up against wall.   Sit<>stand with 360 deg turn in between  x5 R, x5L   Make sure all dizziness is resolved between reps

## 2020-01-16 ENCOUNTER — TELEPHONE (OUTPATIENT)
Dept: PHYSICAL THERAPY | Facility: HOSPITAL | Age: 60
End: 2020-01-16

## 2020-01-20 ENCOUNTER — APPOINTMENT (OUTPATIENT)
Dept: PHYSICAL THERAPY | Age: 60
End: 2020-01-20
Attending: FAMILY MEDICINE
Payer: COMMERCIAL

## 2020-02-03 ENCOUNTER — APPOINTMENT (OUTPATIENT)
Dept: PHYSICAL THERAPY | Age: 60
End: 2020-02-03
Attending: FAMILY MEDICINE
Payer: COMMERCIAL

## 2020-02-17 ENCOUNTER — APPOINTMENT (OUTPATIENT)
Dept: GENERAL RADIOLOGY | Facility: HOSPITAL | Age: 60
End: 2020-02-17
Attending: EMERGENCY MEDICINE
Payer: COMMERCIAL

## 2020-02-17 ENCOUNTER — HOSPITAL ENCOUNTER (EMERGENCY)
Facility: HOSPITAL | Age: 60
Discharge: HOME | End: 2020-02-17
Attending: EMERGENCY MEDICINE
Payer: COMMERCIAL

## 2020-02-17 ENCOUNTER — TELEPHONE (OUTPATIENT)
Dept: CARDIOLOGY | Age: 60
End: 2020-02-17

## 2020-02-17 ENCOUNTER — HOSPITAL ENCOUNTER (OUTPATIENT)
Dept: CV DIAGNOSTICS | Facility: HOSPITAL | Age: 60
Discharge: HOME | End: 2020-02-17
Attending: NURSE PRACTITIONER
Payer: COMMERCIAL

## 2020-02-17 VITALS
WEIGHT: 190 LBS | DIASTOLIC BLOOD PRESSURE: 80 MMHG | OXYGEN SATURATION: 97 % | HEART RATE: 64 BPM | HEIGHT: 62 IN | SYSTOLIC BLOOD PRESSURE: 140 MMHG | BODY MASS INDEX: 34.96 KG/M2 | RESPIRATION RATE: 15 BRPM

## 2020-02-17 VITALS
RESPIRATION RATE: 14 BRPM | DIASTOLIC BLOOD PRESSURE: 77 MMHG | HEART RATE: 67 BPM | OXYGEN SATURATION: 93 % | WEIGHT: 190 LBS | HEIGHT: 62 IN | SYSTOLIC BLOOD PRESSURE: 130 MMHG | BODY MASS INDEX: 34.96 KG/M2

## 2020-02-17 DIAGNOSIS — I49.3 SYMPTOMATIC PVCS: Primary | ICD-10-CM

## 2020-02-17 DIAGNOSIS — I49.3 PVC'S (PREMATURE VENTRICULAR CONTRACTIONS): ICD-10-CM

## 2020-02-17 DIAGNOSIS — R00.2 PALPITATIONS: Primary | ICD-10-CM

## 2020-02-17 DIAGNOSIS — R00.2 PALPITATIONS: ICD-10-CM

## 2020-02-17 DIAGNOSIS — I49.3 PVC'S (PREMATURE VENTRICULAR CONTRACTIONS): Primary | ICD-10-CM

## 2020-02-17 DIAGNOSIS — F41.9 ANXIETY: ICD-10-CM

## 2020-02-17 DIAGNOSIS — R06.09 DYSPNEA ON EXERTION: ICD-10-CM

## 2020-02-17 LAB
ALBUMIN SERPL-MCNC: 3.6 G/DL (ref 3.4–5)
ALBUMIN/GLOB SERPL: 0.8 {RATIO} (ref 1–2)
ALP LIVER SERPL-CCNC: 70 U/L (ref 50–130)
ALT SERPL-CCNC: 20 U/L (ref 13–56)
ANION GAP SERPL CALC-SCNC: 8 MMOL/L (ref 0–18)
AST SERPL-CCNC: 16 U/L (ref 15–37)
ATRIAL RATE: 82 BPM
BASOPHILS # BLD AUTO: 0.05 X10(3) UL (ref 0–0.2)
BASOPHILS NFR BLD AUTO: 0.6 %
BILIRUB SERPL-MCNC: 0.7 MG/DL (ref 0.1–2)
BUN BLD-MCNC: 19 MG/DL (ref 7–18)
BUN/CREAT SERPL: 20.9 (ref 10–20)
CALCIUM BLD-MCNC: 9.6 MG/DL (ref 8.5–10.1)
CHLORIDE SERPL-SCNC: 106 MMOL/L (ref 98–112)
CO2 SERPL-SCNC: 25 MMOL/L (ref 21–32)
CREAT BLD-MCNC: 0.91 MG/DL (ref 0.55–1.02)
DEPRECATED RDW RBC AUTO: 41.7 FL (ref 35.1–46.3)
EOSINOPHIL # BLD AUTO: 0.05 X10(3) UL (ref 0–0.7)
EOSINOPHIL NFR BLD AUTO: 0.6 %
ERYTHROCYTE [DISTWIDTH] IN BLOOD BY AUTOMATED COUNT: 12.7 % (ref 11–15)
GLOBULIN PLAS-MCNC: 4.5 G/DL (ref 2.8–4.4)
GLUCOSE BLD-MCNC: 109 MG/DL (ref 70–99)
HAV IGM SER QL: 2.2 MG/DL (ref 1.6–2.6)
HCT VFR BLD AUTO: 43.8 % (ref 35–48)
HGB BLD-MCNC: 14.9 G/DL (ref 12–16)
IMM GRANULOCYTES # BLD AUTO: 0.02 X10(3) UL (ref 0–1)
IMM GRANULOCYTES NFR BLD: 0.2 %
LYMPHOCYTES # BLD AUTO: 2.76 X10(3) UL (ref 1–4)
LYMPHOCYTES NFR BLD AUTO: 31 %
M PROTEIN MFR SERPL ELPH: 8.1 G/DL (ref 6.4–8.2)
MCH RBC QN AUTO: 30.3 PG (ref 26–34)
MCHC RBC AUTO-ENTMCNC: 34 G/DL (ref 31–37)
MCV RBC AUTO: 89.2 FL (ref 80–100)
MONOCYTES # BLD AUTO: 0.6 X10(3) UL (ref 0.1–1)
MONOCYTES NFR BLD AUTO: 6.7 %
NEUTROPHILS # BLD AUTO: 5.42 X10 (3) UL (ref 1.5–7.7)
NEUTROPHILS # BLD AUTO: 5.42 X10(3) UL (ref 1.5–7.7)
NEUTROPHILS NFR BLD AUTO: 60.9 %
OSMOLALITY SERPL CALC.SUM OF ELEC: 291 MOSM/KG (ref 275–295)
P AXIS: 74 DEGREES
P-R INTERVAL: 120 MS
PLATELET # BLD AUTO: 296 10(3)UL (ref 150–450)
POTASSIUM SERPL-SCNC: 3.6 MMOL/L (ref 3.5–5.1)
Q-T INTERVAL: 378 MS
QRS DURATION: 82 MS
QTC CALCULATION (BEZET): 441 MS
R AXIS: -9 DEGREES
RBC # BLD AUTO: 4.91 X10(6)UL (ref 3.8–5.3)
SODIUM SERPL-SCNC: 139 MMOL/L (ref 136–145)
T AXIS: 10 DEGREES
TROPONIN I SERPL-MCNC: <0.045 NG/ML (ref ?–0.04)
TSI SER-ACNC: 3.07 MIU/ML (ref 0.36–3.74)
VENTRICULAR RATE: 82 BPM
WBC # BLD AUTO: 8.9 X10(3) UL (ref 4–11)

## 2020-02-17 PROCEDURE — 83735 ASSAY OF MAGNESIUM: CPT | Performed by: EMERGENCY MEDICINE

## 2020-02-17 PROCEDURE — 80053 COMPREHEN METABOLIC PANEL: CPT | Performed by: EMERGENCY MEDICINE

## 2020-02-17 PROCEDURE — 93005 ELECTROCARDIOGRAM TRACING: CPT

## 2020-02-17 PROCEDURE — 93010 ELECTROCARDIOGRAM REPORT: CPT

## 2020-02-17 PROCEDURE — 85025 COMPLETE CBC W/AUTO DIFF WBC: CPT | Performed by: EMERGENCY MEDICINE

## 2020-02-17 PROCEDURE — 84443 ASSAY THYROID STIM HORMONE: CPT | Performed by: EMERGENCY MEDICINE

## 2020-02-17 PROCEDURE — 36415 COLL VENOUS BLD VENIPUNCTURE: CPT

## 2020-02-17 PROCEDURE — 71045 X-RAY EXAM CHEST 1 VIEW: CPT | Performed by: EMERGENCY MEDICINE

## 2020-02-17 PROCEDURE — 93225 XTRNL ECG REC<48 HRS REC: CPT | Performed by: NURSE PRACTITIONER

## 2020-02-17 PROCEDURE — 99284 EMERGENCY DEPT VISIT MOD MDM: CPT

## 2020-02-17 PROCEDURE — 93227 XTRNL ECG REC<48 HR R&I: CPT | Performed by: NURSE PRACTITIONER

## 2020-02-17 PROCEDURE — 84484 ASSAY OF TROPONIN QUANT: CPT | Performed by: EMERGENCY MEDICINE

## 2020-02-17 PROCEDURE — 93226 XTRNL ECG REC<48 HR SCAN A/R: CPT | Performed by: NURSE PRACTITIONER

## 2020-02-17 PROCEDURE — 99285 EMERGENCY DEPT VISIT HI MDM: CPT

## 2020-02-17 PROCEDURE — 93000 ELECTROCARDIOGRAM COMPLETE: CPT | Performed by: INTERNAL MEDICINE

## 2020-02-17 RX ORDER — ALPRAZOLAM 0.5 MG/1
0.5 TABLET ORAL ONCE
Status: COMPLETED | OUTPATIENT
Start: 2020-02-17 | End: 2020-02-17

## 2020-02-17 RX ORDER — ASPIRIN 81 MG/1
324 TABLET, CHEWABLE ORAL ONCE
Status: COMPLETED | OUTPATIENT
Start: 2020-02-17 | End: 2020-02-17

## 2020-02-17 RX ORDER — ATENOLOL 25 MG/1
12.5 TABLET ORAL ONCE
Status: COMPLETED | OUTPATIENT
Start: 2020-02-17 | End: 2020-02-17

## 2020-02-17 NOTE — ED INITIAL ASSESSMENT (HPI)
PT returned to ER for concern over abnormal EKG. Pt was seen in ER this morning for difficulty breathing, some headache and back pain associated w/ PVC's.  In addition, PT was seen by Dr. Beryle Even this afternoon for same

## 2020-02-17 NOTE — ED PROVIDER NOTES
Patient Seen in: [facility] Emergency Department      History   Patient presents with:  Dyspnea DEVIN SOB    Stated Complaint: sob started earlier today    HPI    57-year-old female presents to the emergency department for evaluation of palpitations. Smoking status: Never Smoker      Smokeless tobacco: Never Used    Alcohol use: No      Alcohol/week: 0.0 standard drinks    Drug use:  No             Review of Systems    Positive for stated complaint: sob started earlier today  Other systems are as noted Clinical Impression:  PVC's (premature ventricular contractions)  (primary encounter diagnosis)  Anxiety    Disposition:  Discharge  2/17/2020  6:13 pm    Follow-up:  Severino Narvaez MD  [address] [phone]

## 2020-02-17 NOTE — ED INITIAL ASSESSMENT (HPI)
Pt woke up sleep with DEVIN and heart palpitations. Pt feels like she might be having PVC's.  Pt denies any chest pain

## 2020-02-17 NOTE — ED PROVIDER NOTES
Patient Seen in: [facility] Emergency Department      History   Patient presents with:  Dyspnea DEVIN SOB  Arrythmia/Palpitations    Stated Complaint: DEVIN     HPI  Patient is a 14-year-old female who states that for the past 6 months she has had issue MD at [facility] ENDOSCOPY   • ESOPHAGOGASTRODUODENOSCOPY (EGD) N/A 7/9/2018    Performed by Paul Mijares MD at [address] [place] [address] 7/8/2019    Performed by Carola Grullon MD at [facility] MAIN OR   • LUMPECTOMY LEFT  1/2014    ALH/LCIS COMP METABOLIC PANEL (14) - Abnormal; Notable for the following components:       Result Value    Glucose 109 (*)     BUN 19 (*)     BUN/CREA Ratio 20.9 (*)     Globulin  4.5 (*)     A/G Ratio 0.8 (*)     All other components within normal limits   TROPO am    Follow-up:  Annel Mata MD  [phone]    In 2 days          Medications Prescribed:  Current Discharge Medication List

## 2020-02-18 LAB
ATRIAL RATE: 76 BPM
P AXIS: 68 DEGREES
P-R INTERVAL: 144 MS
Q-T INTERVAL: 394 MS
QRS DURATION: 92 MS
QTC CALCULATION (BEZET): 443 MS
R AXIS: -6 DEGREES
T AXIS: 2 DEGREES
VENTRICULAR RATE: 76 BPM

## 2020-02-25 ENCOUNTER — HOSPITAL ENCOUNTER (OUTPATIENT)
Dept: CV DIAGNOSTICS | Facility: HOSPITAL | Age: 60
Discharge: HOME | End: 2020-02-25
Attending: INTERNAL MEDICINE
Payer: COMMERCIAL

## 2020-02-25 DIAGNOSIS — R00.2 PALPITATIONS: ICD-10-CM

## 2020-02-25 DIAGNOSIS — R06.00 DYSPNEA ON EXERTION: ICD-10-CM

## 2020-02-25 DIAGNOSIS — I49.3 PVC'S (PREMATURE VENTRICULAR CONTRACTIONS): ICD-10-CM

## 2020-02-25 PROCEDURE — 93306 TTE W/DOPPLER COMPLETE: CPT | Performed by: INTERNAL MEDICINE

## 2020-02-27 ENCOUNTER — E-ADVICE (OUTPATIENT)
Dept: CARDIOLOGY | Age: 60
End: 2020-02-27

## 2020-03-06 RX ORDER — ALPRAZOLAM 0.25 MG/1
TABLET ORAL
Refills: 0 | COMMUNITY
Start: 2019-12-30

## 2020-03-09 ENCOUNTER — OFFICE VISIT (OUTPATIENT)
Dept: CARDIOLOGY | Age: 60
End: 2020-03-09

## 2020-03-09 VITALS
HEIGHT: 62 IN | HEART RATE: 64 BPM | SYSTOLIC BLOOD PRESSURE: 124 MMHG | BODY MASS INDEX: 35.51 KG/M2 | DIASTOLIC BLOOD PRESSURE: 70 MMHG | WEIGHT: 193 LBS

## 2020-03-09 DIAGNOSIS — R00.2 PALPITATIONS: ICD-10-CM

## 2020-03-09 DIAGNOSIS — I49.3 PVC'S (PREMATURE VENTRICULAR CONTRACTIONS): Primary | ICD-10-CM

## 2020-03-09 PROCEDURE — 99213 OFFICE O/P EST LOW 20 MIN: CPT | Performed by: INTERNAL MEDICINE

## 2020-03-09 RX ORDER — ATENOLOL 25 MG/1
TABLET ORAL
Qty: 90 TABLET | Refills: 3 | Status: SHIPPED | OUTPATIENT
Start: 2020-03-09 | End: 2020-08-31

## 2020-07-31 ENCOUNTER — TELEPHONE (OUTPATIENT)
Dept: INTERNAL MEDICINE CLINIC | Facility: HOSPITAL | Age: 60
End: 2020-07-31

## 2020-07-31 DIAGNOSIS — Z78.9 PARTICIPANT IN HEALTH AND WELLNESS PLAN: Primary | ICD-10-CM

## 2020-08-21 ENCOUNTER — OFFICE VISIT (OUTPATIENT)
Dept: SURGERY | Facility: CLINIC | Age: 60
End: 2020-08-21
Payer: COMMERCIAL

## 2020-08-21 VITALS — SYSTOLIC BLOOD PRESSURE: 115 MMHG | HEART RATE: 71 BPM | DIASTOLIC BLOOD PRESSURE: 81 MMHG | TEMPERATURE: 96 F

## 2020-08-21 DIAGNOSIS — L98.9 LEG SKIN LESION, RIGHT: Primary | ICD-10-CM

## 2020-08-21 PROBLEM — K35.31 ACUTE APPENDICITIS WITH LOCALIZED PERITONITIS AND GANGRENE, WITHOUT PERFORATION OR ABSCESS: Status: RESOLVED | Noted: 2019-07-08 | Resolved: 2020-08-21

## 2020-08-21 PROCEDURE — 3074F SYST BP LT 130 MM HG: CPT | Performed by: SURGERY

## 2020-08-21 PROCEDURE — 99213 OFFICE O/P EST LOW 20 MIN: CPT | Performed by: SURGERY

## 2020-08-21 PROCEDURE — 3079F DIAST BP 80-89 MM HG: CPT | Performed by: SURGERY

## 2020-08-21 NOTE — PROGRESS NOTES
Follow Up Visit Note       Active Problems      1.  Leg skin lesion, right          Chief Complaint   Patient presents with:  Cyst: NP cyst on on the bottom of right buttock, pt states cyst has increased in size        History of Present Illness  The patien BIOPSY STEREO NODULE 2 SITE BILAT (CPT=19081/58818)  2011    ALH/LCIS   • OTHER SURGICAL HISTORY      Uterine polyp removal   • OTHER SURGICAL HISTORY      Cyst on neck removal       The family history and social history have been reviewed by me today. has been reviewed by me during today. Review of Systems   Constitutional: Negative for chills, diaphoresis, fatigue, fever and unexpected weight change. HENT: Negative for hearing loss, nosebleeds, sore throat and trouble swallowing.     Respiratory: Neg less busy at work. · She will call to schedule her procedure as her personal schedule allows.     Ana Zhou MD

## 2020-08-27 ENCOUNTER — NURSE ONLY (OUTPATIENT)
Dept: LAB | Age: 60
End: 2020-08-27
Attending: PREVENTIVE MEDICINE

## 2020-08-27 DIAGNOSIS — Z78.9 PARTICIPANT IN HEALTH AND WELLNESS PLAN: ICD-10-CM

## 2020-08-27 LAB — SARS-COV-2 IGG SERPLBLD QL IA.RAPID: NEGATIVE

## 2020-08-27 PROCEDURE — 86769 SARS-COV-2 COVID-19 ANTIBODY: CPT

## 2020-08-31 RX ORDER — ATENOLOL 25 MG/1
TABLET ORAL
Qty: 45 TABLET | Refills: 3 | Status: SHIPPED | OUTPATIENT
Start: 2020-08-31 | End: 2020-12-22 | Stop reason: CLARIF

## 2020-09-22 RX ORDER — FAMOTIDINE 40 MG/1
TABLET, FILM COATED ORAL
COMMUNITY
Start: 2020-07-01

## 2020-11-18 ENCOUNTER — HOSPITAL ENCOUNTER (OUTPATIENT)
Age: 60
Discharge: ANOTHER HOSPITAL | End: 2020-11-18
Payer: COMMERCIAL

## 2020-11-18 ENCOUNTER — APPOINTMENT (OUTPATIENT)
Dept: ULTRASOUND IMAGING | Facility: HOSPITAL | Age: 60
End: 2020-11-18
Attending: EMERGENCY MEDICINE
Payer: COMMERCIAL

## 2020-11-18 ENCOUNTER — HOSPITAL ENCOUNTER (EMERGENCY)
Facility: HOSPITAL | Age: 60
Discharge: HOME | End: 2020-11-18
Attending: EMERGENCY MEDICINE
Payer: COMMERCIAL

## 2020-11-18 VITALS
TEMPERATURE: 99 F | SYSTOLIC BLOOD PRESSURE: 130 MMHG | RESPIRATION RATE: 14 BRPM | DIASTOLIC BLOOD PRESSURE: 84 MMHG | HEART RATE: 80 BPM | OXYGEN SATURATION: 96 %

## 2020-11-18 VITALS
HEIGHT: 62 IN | WEIGHT: 215 LBS | SYSTOLIC BLOOD PRESSURE: 123 MMHG | BODY MASS INDEX: 39.56 KG/M2 | HEART RATE: 80 BPM | DIASTOLIC BLOOD PRESSURE: 81 MMHG | OXYGEN SATURATION: 97 % | RESPIRATION RATE: 18 BRPM | TEMPERATURE: 98 F

## 2020-11-18 DIAGNOSIS — M79.662 PAIN OF LEFT CALF: Primary | ICD-10-CM

## 2020-11-18 DIAGNOSIS — I80.02 THROMBOPHLEBITIS OF SUPERFICIAL VEINS OF LEFT LOWER EXTREMITY: Primary | ICD-10-CM

## 2020-11-18 PROCEDURE — 99213 OFFICE O/P EST LOW 20 MIN: CPT

## 2020-11-18 PROCEDURE — 99284 EMERGENCY DEPT VISIT MOD MDM: CPT

## 2020-11-18 PROCEDURE — 93971 EXTREMITY STUDY: CPT | Performed by: EMERGENCY MEDICINE

## 2020-11-19 NOTE — ED PROVIDER NOTES
Patient Seen in: [facility] Emergency Department      History   Patient presents with:  Deep Vein Thrombosis    Stated Complaint: r/o dvt     HPI    Patient is a pleasant 76-year-old female, presenting for evaluation of discomfort to her left medial Positive for stated complaint: r/o dvt   Other systems are as noted in HPI. Constitutional and vital signs reviewed. All other systems reviewed and negative except as noted above.     Physical Exam     ED Triage Vitals [11/18/20 2007]   /81   Pu PROCEDURE:  US VENOUS DOPPLER LEG LEFT - DIAG IMG (CPT=93971)  COMPARISON:  None. INDICATIONS:  eval for DVT  TECHNIQUE:  Real time, grey scale, and duplex ultrasound was used to evaluate the lower extremity venous system.  B-mode two-dimensional images of Thrombophlebitis of superficial veins of left lower extremity  (primary encounter diagnosis)    Disposition:  Discharge  11/18/2020  9:44 pm    Follow-up:  Hannah Crowley, [address] [place] [address]     Schedule an ap

## 2020-11-19 NOTE — ED PROVIDER NOTES
Patient Seen in: [facility]      History   Patient presents with:  Pain    Stated Complaint: Pain/Redness to L calve  c/o cellulits    HPI  40-year-old female presents to the immediate care complaining of left medial calf redness, swellin Drug use: No             Review of Systems   All other systems reviewed and are negative. Positive for stated complaint: Pain/Redness to L calve  c/o cellulits  Other systems are as noted in HPI. Constitutional and vital signs reviewed.       All othe [address]  [phone]  Go to             Medications Prescribed:  Current Discharge Medication List

## 2020-12-02 ENCOUNTER — TELEPHONE (OUTPATIENT)
Dept: CARDIOLOGY | Age: 60
End: 2020-12-02

## 2020-12-22 ENCOUNTER — HOSPITAL ENCOUNTER (OUTPATIENT)
Dept: ULTRASOUND IMAGING | Age: 60
Discharge: HOME | End: 2020-12-22
Attending: FAMILY MEDICINE
Payer: COMMERCIAL

## 2020-12-22 ENCOUNTER — OFFICE VISIT (OUTPATIENT)
Dept: CARDIOLOGY | Age: 60
End: 2020-12-22

## 2020-12-22 VITALS
HEART RATE: 84 BPM | DIASTOLIC BLOOD PRESSURE: 80 MMHG | BODY MASS INDEX: 40.48 KG/M2 | HEIGHT: 62 IN | SYSTOLIC BLOOD PRESSURE: 120 MMHG | WEIGHT: 220 LBS

## 2020-12-22 DIAGNOSIS — R00.2 PALPITATIONS: ICD-10-CM

## 2020-12-22 DIAGNOSIS — I49.3 VENTRICULAR PREMATURE DEPOLARIZATION: ICD-10-CM

## 2020-12-22 DIAGNOSIS — R06.09 DYSPNEA ON EXERTION: Primary | ICD-10-CM

## 2020-12-22 DIAGNOSIS — I49.3 PVC'S (PREMATURE VENTRICULAR CONTRACTIONS): ICD-10-CM

## 2020-12-22 DIAGNOSIS — R93.5 ABNORMAL COMPUTED TOMOGRAPHY OF PELVIS: ICD-10-CM

## 2020-12-22 PROCEDURE — 76830 TRANSVAGINAL US NON-OB: CPT | Performed by: FAMILY MEDICINE

## 2020-12-22 PROCEDURE — 76856 US EXAM PELVIC COMPLETE: CPT | Performed by: FAMILY MEDICINE

## 2020-12-22 PROCEDURE — 99213 OFFICE O/P EST LOW 20 MIN: CPT | Performed by: NURSE PRACTITIONER

## 2020-12-22 RX ORDER — ATENOLOL 25 MG/1
12.5 TABLET ORAL 2 TIMES DAILY
Qty: 90 TABLET | Refills: 3 | Status: SHIPPED | OUTPATIENT
Start: 2020-12-22 | End: 2021-03-10 | Stop reason: SDUPTHER

## 2020-12-22 RX ORDER — ATENOLOL 25 MG/1
12.5 TABLET ORAL 2 TIMES DAILY
COMMUNITY
End: 2020-12-22 | Stop reason: SDUPTHER

## 2020-12-22 RX ORDER — PAROXETINE HYDROCHLORIDE 20 MG/1
20 TABLET, FILM COATED ORAL DAILY
COMMUNITY

## 2020-12-22 ASSESSMENT — ENCOUNTER SYMPTOMS
ORTHOPNEA: 0
BLOATING: 0
NEAR-SYNCOPE: 0
COUGH: 0
SHORTNESS OF BREATH: 0
NIGHT SWEATS: 0
ABDOMINAL PAIN: 0
SYNCOPE: 0
FEVER: 0

## 2020-12-22 ASSESSMENT — PATIENT HEALTH QUESTIONNAIRE - PHQ9
CLINICAL INTERPRETATION OF PHQ9 SCORE: NO FURTHER SCREENING NEEDED
CLINICAL INTERPRETATION OF PHQ2 SCORE: NO FURTHER SCREENING NEEDED
SUM OF ALL RESPONSES TO PHQ9 QUESTIONS 1 AND 2: 0
1. LITTLE INTEREST OR PLEASURE IN DOING THINGS: NOT AT ALL
SUM OF ALL RESPONSES TO PHQ9 QUESTIONS 1 AND 2: 0
2. FEELING DOWN, DEPRESSED OR HOPELESS: NOT AT ALL

## 2020-12-29 ENCOUNTER — OFFICE VISIT (OUTPATIENT)
Dept: OBGYN CLINIC | Facility: CLINIC | Age: 60
End: 2020-12-29
Payer: COMMERCIAL

## 2020-12-29 VITALS
SYSTOLIC BLOOD PRESSURE: 110 MMHG | WEIGHT: 217 LBS | DIASTOLIC BLOOD PRESSURE: 76 MMHG | BODY MASS INDEX: 40 KG/M2 | HEART RATE: 68 BPM

## 2020-12-29 DIAGNOSIS — R93.89 THICKENED ENDOMETRIUM: ICD-10-CM

## 2020-12-29 DIAGNOSIS — Z01.419 ENCOUNTER FOR GYNECOLOGICAL EXAMINATION WITHOUT ABNORMAL FINDING: ICD-10-CM

## 2020-12-29 DIAGNOSIS — Z12.4 SCREENING FOR MALIGNANT NEOPLASM OF CERVIX: Primary | ICD-10-CM

## 2020-12-29 PROCEDURE — 99203 OFFICE O/P NEW LOW 30 MIN: CPT | Performed by: OBSTETRICS & GYNECOLOGY

## 2020-12-29 PROCEDURE — 3078F DIAST BP <80 MM HG: CPT | Performed by: OBSTETRICS & GYNECOLOGY

## 2020-12-29 PROCEDURE — 99386 PREV VISIT NEW AGE 40-64: CPT | Performed by: OBSTETRICS & GYNECOLOGY

## 2020-12-29 PROCEDURE — 3074F SYST BP LT 130 MM HG: CPT | Performed by: OBSTETRICS & GYNECOLOGY

## 2020-12-29 RX ORDER — PAROXETINE HYDROCHLORIDE 20 MG/1
10 TABLET, FILM COATED ORAL NIGHTLY
COMMUNITY
Start: 2019-07-30

## 2020-12-29 RX ORDER — ALPRAZOLAM 0.25 MG/1
TABLET ORAL
COMMUNITY
Start: 2019-12-30 | End: 2021-02-16

## 2020-12-29 RX ORDER — FAMOTIDINE 40 MG/1
40 TABLET, FILM COATED ORAL EVERY OTHER DAY
COMMUNITY

## 2020-12-29 NOTE — PROGRESS NOTES
Paul Dominguez    1960       Patient presents with:  Mike Palacios Exam: ANNUAL EXAM  pt had emergency appendectomy 2019 and was told that on the preop film that was done there was some thickening of the endometrial lining.   She has not had any vaginal bl CHOLECYSTECTOMY      2004   • COLONOSCOPY N/A 7/9/2018    Performed by Adalid Senior MD at [facility] ENDOSCOPY   • ESOPHAGOGASTRODUODENOSCOPY (EGD) N/A 7/9/2018    Performed by Adalid Senior MD at [address] [address] N/A 7/8/2019    Per discharge, nipple retraction or skin changes  Abdomen:  soft, nontender, nondistended, no masses  Psychiatric:  Oriented to time, place, person and situation.  Appropriate mood and affect    Pelvic Exam:  External Genitalia: normal appearance, hair distribu

## 2020-12-30 ENCOUNTER — HOSPITAL ENCOUNTER (OUTPATIENT)
Dept: MAMMOGRAPHY | Facility: HOSPITAL | Age: 60
Discharge: HOME | End: 2020-12-30
Attending: FAMILY MEDICINE
Payer: COMMERCIAL

## 2020-12-30 DIAGNOSIS — N60.92 ATYPICAL LOBULAR HYPERPLASIA OF LEFT BREAST: ICD-10-CM

## 2020-12-30 DIAGNOSIS — Z86.000 PERSONAL HISTORY OF IN-SITU NEOPLASM OF BREAST: ICD-10-CM

## 2020-12-30 DIAGNOSIS — Z98.890 STATUS POST LEFT BREAST LUMPECTOMY: ICD-10-CM

## 2020-12-30 PROCEDURE — 77066 DX MAMMO INCL CAD BI: CPT | Performed by: FAMILY MEDICINE

## 2020-12-30 PROCEDURE — 77062 BREAST TOMOSYNTHESIS BI: CPT | Performed by: FAMILY MEDICINE

## 2021-01-19 ENCOUNTER — E-ADVICE (OUTPATIENT)
Dept: CARDIOLOGY | Age: 61
End: 2021-01-19

## 2021-01-19 DIAGNOSIS — I49.3 PVC'S (PREMATURE VENTRICULAR CONTRACTIONS): ICD-10-CM

## 2021-01-19 DIAGNOSIS — R00.2 PALPITATIONS: Primary | ICD-10-CM

## 2021-01-21 ENCOUNTER — HOSPITAL ENCOUNTER (OUTPATIENT)
Dept: CV DIAGNOSTICS | Facility: HOSPITAL | Age: 61
Discharge: HOME | End: 2021-01-21
Attending: NURSE PRACTITIONER
Payer: COMMERCIAL

## 2021-01-21 DIAGNOSIS — I49.3 PVC'S (PREMATURE VENTRICULAR CONTRACTIONS): ICD-10-CM

## 2021-01-21 DIAGNOSIS — R00.2 PALPITATIONS: ICD-10-CM

## 2021-01-21 PROCEDURE — 93226 XTRNL ECG REC<48 HR SCAN A/R: CPT | Performed by: NURSE PRACTITIONER

## 2021-01-21 PROCEDURE — 93227 XTRNL ECG REC<48 HR R&I: CPT | Performed by: NURSE PRACTITIONER

## 2021-01-21 PROCEDURE — 93225 XTRNL ECG REC<48 HRS REC: CPT | Performed by: NURSE PRACTITIONER

## 2021-01-29 ENCOUNTER — TELEPHONE (OUTPATIENT)
Dept: CARDIOLOGY | Age: 61
End: 2021-01-29

## 2021-02-05 ENCOUNTER — TELEPHONE (OUTPATIENT)
Dept: OBGYN CLINIC | Facility: CLINIC | Age: 61
End: 2021-02-05

## 2021-02-05 NOTE — TELEPHONE ENCOUNTER
Pt saw CAP in December, was told to return for embx with CAP. Appt made. Pt will have daughter drive her so she can take her xanax. Pt verbalized understanding.

## 2021-02-16 ENCOUNTER — OFFICE VISIT (OUTPATIENT)
Dept: OBGYN CLINIC | Facility: CLINIC | Age: 61
End: 2021-02-16
Payer: COMMERCIAL

## 2021-02-16 VITALS
SYSTOLIC BLOOD PRESSURE: 120 MMHG | DIASTOLIC BLOOD PRESSURE: 75 MMHG | HEART RATE: 71 BPM | WEIGHT: 220.38 LBS | BODY MASS INDEX: 40 KG/M2

## 2021-02-16 DIAGNOSIS — N95.0 POSTMENOPAUSAL BLEEDING: Primary | ICD-10-CM

## 2021-02-16 PROCEDURE — 3074F SYST BP LT 130 MM HG: CPT | Performed by: OBSTETRICS & GYNECOLOGY

## 2021-02-16 PROCEDURE — 58100 BIOPSY OF UTERUS LINING: CPT | Performed by: OBSTETRICS & GYNECOLOGY

## 2021-02-16 PROCEDURE — 3078F DIAST BP <80 MM HG: CPT | Performed by: OBSTETRICS & GYNECOLOGY

## 2021-02-16 RX ORDER — ATENOLOL 25 MG/1
25 TABLET ORAL 2 TIMES DAILY
COMMUNITY
Start: 2021-01-29

## 2021-02-17 ENCOUNTER — TELEPHONE (OUTPATIENT)
Dept: OBGYN CLINIC | Facility: CLINIC | Age: 61
End: 2021-02-17

## 2021-02-17 DIAGNOSIS — R93.89 THICKENED ENDOMETRIUM: Primary | ICD-10-CM

## 2021-02-17 NOTE — TELEPHONE ENCOUNTER
OB GYN SURGICAL SCHEDULING    Assessment: thickened endometrium    Pre-Operative Procedure:  Hysteroscopy - surgical, D&C with myosure     Admission:  Day Surgery    Anesthesia: General    Additional Orders:  Routine Orders    Comments / Orders to Nurse: p

## 2021-02-23 NOTE — TELEPHONE ENCOUNTER
Spoke to pt. Aware surgery is scheduled on Thursday,03/11/2021 at 3pm with possibility to move up tp 1pm. Aware if moved up will call week before surgery. Pt states her PCP Radha Sandoval wants to know what exactly she needs cleared before surgery.  Has appt wayne

## 2021-02-24 NOTE — TELEPHONE ENCOUNTER
Pt advised of below recs per CAP. Pt requesting we notified her doctor's office as well # [phone]. Nurse Sancho Galaviz notified of CAP's msg. Nurse asking if doctor wants any labs or EKG done.  States provider is affiliated with [facility] and [facility] and nor

## 2021-03-04 ENCOUNTER — TELEPHONE (OUTPATIENT)
Dept: OBGYN CLINIC | Facility: CLINIC | Age: 61
End: 2021-03-04

## 2021-03-04 NOTE — TELEPHONE ENCOUNTER
Radhames Maguire, from [facility] office calling to speak with nurse regarding surgery clearance required for upcoming surgery scheduled 3/11/68847. Needs clarification on what type of surgery. Please call at:[phone],thanks.

## 2021-03-04 NOTE — TELEPHONE ENCOUNTER
Surgical case moved up to 1pm    Updated pt via Voicemail.     Updated instructions updated book and calender

## 2021-03-05 NOTE — TELEPHONE ENCOUNTER
Verbal per CAP just wants a physical exam done to insure pt is healthy to go under general anesthesia    Called office and LM with answering service states they will relate message

## 2021-03-05 NOTE — TELEPHONE ENCOUNTER
Surgery change request sent to add   :need minidilators due to cervical stenosis, also need endometrial pipelle in the room.

## 2021-03-05 NOTE — TELEPHONE ENCOUNTER
Pt is having a hysteroscopy/D&C for postmenopausal bleeding.       OB GYN SURGICAL SCHEDULING    Assessment: PMB, cervical stenosis    Pre-Operative Procedure:  Hysteroscopy - surgical, D&C      Admission:  Day Surgery    Anesthesia: General    Additional O

## 2021-03-06 ENCOUNTER — LAB ENCOUNTER (OUTPATIENT)
Dept: LAB | Facility: HOSPITAL | Age: 61
End: 2021-03-06
Attending: FAMILY MEDICINE
Payer: COMMERCIAL

## 2021-03-06 DIAGNOSIS — E78.2 MIXED HYPERLIPIDEMIA: Primary | ICD-10-CM

## 2021-03-06 DIAGNOSIS — R53.83 FATIGUE: ICD-10-CM

## 2021-03-06 DIAGNOSIS — R00.2 PALPITATIONS: ICD-10-CM

## 2021-03-06 LAB
ALBUMIN SERPL-MCNC: 3.9 G/DL (ref 3.4–5)
ALBUMIN/GLOB SERPL: 1.1 {RATIO} (ref 1–2)
ALP LIVER SERPL-CCNC: 71 U/L
ALT SERPL-CCNC: 25 U/L
ANION GAP SERPL CALC-SCNC: 4 MMOL/L (ref 0–18)
AST SERPL-CCNC: 17 U/L (ref 15–37)
BASOPHILS # BLD AUTO: 0.07 X10(3) UL (ref 0–0.2)
BASOPHILS NFR BLD AUTO: 1 %
BILIRUB SERPL-MCNC: 0.7 MG/DL (ref 0.1–2)
BUN BLD-MCNC: 18 MG/DL (ref 7–18)
BUN/CREAT SERPL: 19.1 (ref 10–20)
CALCIUM BLD-MCNC: 9.2 MG/DL (ref 8.5–10.1)
CHLORIDE SERPL-SCNC: 107 MMOL/L (ref 98–112)
CHOLEST SMN-MCNC: 257 MG/DL (ref ?–200)
CO2 SERPL-SCNC: 30 MMOL/L (ref 21–32)
CREAT BLD-MCNC: 0.94 MG/DL
DEPRECATED RDW RBC AUTO: 44 FL (ref 35.1–46.3)
EOSINOPHIL # BLD AUTO: 0.04 X10(3) UL (ref 0–0.7)
EOSINOPHIL NFR BLD AUTO: 0.6 %
ERYTHROCYTE [DISTWIDTH] IN BLOOD BY AUTOMATED COUNT: 12.9 % (ref 11–15)
GLOBULIN PLAS-MCNC: 3.7 G/DL (ref 2.8–4.4)
GLUCOSE BLD-MCNC: 105 MG/DL (ref 70–99)
HCT VFR BLD AUTO: 45.3 %
HDLC SERPL-MCNC: 50 MG/DL (ref 40–59)
HGB BLD-MCNC: 14.9 G/DL
IMM GRANULOCYTES # BLD AUTO: 0.01 X10(3) UL (ref 0–1)
IMM GRANULOCYTES NFR BLD: 0.1 %
LDLC SERPL CALC-MCNC: 160 MG/DL (ref ?–100)
LYMPHOCYTES # BLD AUTO: 1.54 X10(3) UL (ref 1–4)
LYMPHOCYTES NFR BLD AUTO: 21.9 %
M PROTEIN MFR SERPL ELPH: 7.6 G/DL (ref 6.4–8.2)
MCH RBC QN AUTO: 30.6 PG (ref 26–34)
MCHC RBC AUTO-ENTMCNC: 32.9 G/DL (ref 31–37)
MCV RBC AUTO: 93 FL
MONOCYTES # BLD AUTO: 0.66 X10(3) UL (ref 0.1–1)
MONOCYTES NFR BLD AUTO: 9.4 %
NEUTROPHILS # BLD AUTO: 4.7 X10 (3) UL (ref 1.5–7.7)
NEUTROPHILS # BLD AUTO: 4.7 X10(3) UL (ref 1.5–7.7)
NEUTROPHILS NFR BLD AUTO: 67 %
NONHDLC SERPL-MCNC: 207 MG/DL (ref ?–130)
OSMOLALITY SERPL CALC.SUM OF ELEC: 294 MOSM/KG (ref 275–295)
PATIENT FASTING Y/N/NP: YES
PATIENT FASTING Y/N/NP: YES
PLATELET # BLD AUTO: 304 10(3)UL (ref 150–450)
POTASSIUM SERPL-SCNC: 4.1 MMOL/L (ref 3.5–5.1)
RBC # BLD AUTO: 4.87 X10(6)UL
SODIUM SERPL-SCNC: 141 MMOL/L (ref 136–145)
TRIGL SERPL-MCNC: 236 MG/DL (ref 30–149)
TSI SER-ACNC: 1.86 MIU/ML (ref 0.36–3.74)
VLDLC SERPL CALC-MCNC: 47 MG/DL (ref 0–30)
WBC # BLD AUTO: 7 X10(3) UL (ref 4–11)

## 2021-03-06 PROCEDURE — 36415 COLL VENOUS BLD VENIPUNCTURE: CPT

## 2021-03-06 PROCEDURE — 84443 ASSAY THYROID STIM HORMONE: CPT

## 2021-03-06 PROCEDURE — 85025 COMPLETE CBC W/AUTO DIFF WBC: CPT

## 2021-03-06 PROCEDURE — 80053 COMPREHEN METABOLIC PANEL: CPT

## 2021-03-06 PROCEDURE — 80061 LIPID PANEL: CPT

## 2021-03-08 ENCOUNTER — EKG ENCOUNTER (OUTPATIENT)
Dept: LAB | Facility: HOSPITAL | Age: 61
End: 2021-03-08
Attending: OBSTETRICS & GYNECOLOGY
Payer: COMMERCIAL

## 2021-03-08 DIAGNOSIS — Z01.818 PREOP TESTING: ICD-10-CM

## 2021-03-08 DIAGNOSIS — Z01.810 PRE-OPERATIVE CARDIOVASCULAR EXAMINATION: Primary | ICD-10-CM

## 2021-03-08 PROCEDURE — 93005 ELECTROCARDIOGRAM TRACING: CPT

## 2021-03-08 PROCEDURE — 93010 ELECTROCARDIOGRAM REPORT: CPT | Performed by: INTERNAL MEDICINE

## 2021-03-09 LAB
ATRIAL RATE: 63 BPM
P AXIS: 62 DEGREES
P-R INTERVAL: 114 MS
Q-T INTERVAL: 420 MS
QRS DURATION: 80 MS
QTC CALCULATION (BEZET): 429 MS
R AXIS: 13 DEGREES
SARS-COV-2 RNA RESP QL NAA+PROBE: NOT DETECTED
T AXIS: 27 DEGREES
VENTRICULAR RATE: 63 BPM

## 2021-03-10 ENCOUNTER — OFFICE VISIT (OUTPATIENT)
Dept: CARDIOLOGY | Age: 61
End: 2021-03-10

## 2021-03-10 VITALS
BODY MASS INDEX: 40.85 KG/M2 | WEIGHT: 222 LBS | HEART RATE: 60 BPM | HEIGHT: 62 IN | DIASTOLIC BLOOD PRESSURE: 70 MMHG | SYSTOLIC BLOOD PRESSURE: 120 MMHG

## 2021-03-10 DIAGNOSIS — I49.3 PVC'S (PREMATURE VENTRICULAR CONTRACTIONS): Primary | ICD-10-CM

## 2021-03-10 DIAGNOSIS — R00.2 PALPITATIONS: ICD-10-CM

## 2021-03-10 PROCEDURE — 99213 OFFICE O/P EST LOW 20 MIN: CPT | Performed by: INTERNAL MEDICINE

## 2021-03-10 RX ORDER — ATENOLOL 25 MG/1
25 TABLET ORAL 2 TIMES DAILY
COMMUNITY
End: 2021-03-17 | Stop reason: SDUPTHER

## 2021-03-10 ASSESSMENT — PATIENT HEALTH QUESTIONNAIRE - PHQ9
SUM OF ALL RESPONSES TO PHQ9 QUESTIONS 1 AND 2: 0
1. LITTLE INTEREST OR PLEASURE IN DOING THINGS: NOT AT ALL
2. FEELING DOWN, DEPRESSED OR HOPELESS: NOT AT ALL
CLINICAL INTERPRETATION OF PHQ9 SCORE: NO FURTHER SCREENING NEEDED
CLINICAL INTERPRETATION OF PHQ2 SCORE: NO FURTHER SCREENING NEEDED
SUM OF ALL RESPONSES TO PHQ9 QUESTIONS 1 AND 2: 0

## 2021-03-11 ENCOUNTER — ANESTHESIA (OUTPATIENT)
Dept: SURGERY | Facility: HOSPITAL | Age: 61
End: 2021-03-11
Payer: COMMERCIAL

## 2021-03-11 ENCOUNTER — HOSPITAL ENCOUNTER (OUTPATIENT)
Facility: HOSPITAL | Age: 61
Setting detail: HOSPITAL OUTPATIENT SURGERY
Discharge: HOME | End: 2021-03-11
Attending: OBSTETRICS & GYNECOLOGY | Admitting: OBSTETRICS & GYNECOLOGY
Payer: COMMERCIAL

## 2021-03-11 ENCOUNTER — ANESTHESIA EVENT (OUTPATIENT)
Dept: SURGERY | Facility: HOSPITAL | Age: 61
End: 2021-03-11
Payer: COMMERCIAL

## 2021-03-11 VITALS
DIASTOLIC BLOOD PRESSURE: 63 MMHG | SYSTOLIC BLOOD PRESSURE: 126 MMHG | RESPIRATION RATE: 17 BRPM | HEIGHT: 62 IN | BODY MASS INDEX: 40.48 KG/M2 | HEART RATE: 70 BPM | TEMPERATURE: 98 F | WEIGHT: 220 LBS | OXYGEN SATURATION: 96 %

## 2021-03-11 DIAGNOSIS — R93.89 THICKENED ENDOMETRIUM: ICD-10-CM

## 2021-03-11 DIAGNOSIS — Z01.818 PREOP TESTING: Primary | ICD-10-CM

## 2021-03-11 PROBLEM — N95.0 POSTMENOPAUSAL BLEEDING: Status: ACTIVE | Noted: 2021-03-11

## 2021-03-11 PROBLEM — N88.2 CERVICAL STENOSIS (UTERINE CERVIX): Status: ACTIVE | Noted: 2021-03-11

## 2021-03-11 PROCEDURE — 0UB98ZZ EXCISION OF UTERUS, VIA NATURAL OR ARTIFICIAL OPENING ENDOSCOPIC: ICD-10-PCS | Performed by: OBSTETRICS & GYNECOLOGY

## 2021-03-11 PROCEDURE — 0UJD8ZZ INSPECTION OF UTERUS AND CERVIX, VIA NATURAL OR ARTIFICIAL OPENING ENDOSCOPIC: ICD-10-PCS | Performed by: OBSTETRICS & GYNECOLOGY

## 2021-03-11 PROCEDURE — 58558 HYSTEROSCOPY BIOPSY: CPT | Performed by: OBSTETRICS & GYNECOLOGY

## 2021-03-11 PROCEDURE — 0UBC7ZX EXCISION OF CERVIX, VIA NATURAL OR ARTIFICIAL OPENING, DIAGNOSTIC: ICD-10-PCS | Performed by: OBSTETRICS & GYNECOLOGY

## 2021-03-11 PROCEDURE — 0UDB7ZX EXTRACTION OF ENDOMETRIUM, VIA NATURAL OR ARTIFICIAL OPENING, DIAGNOSTIC: ICD-10-PCS | Performed by: OBSTETRICS & GYNECOLOGY

## 2021-03-11 RX ORDER — KETOROLAC TROMETHAMINE 30 MG/ML
INJECTION, SOLUTION INTRAMUSCULAR; INTRAVENOUS AS NEEDED
Status: DISCONTINUED | OUTPATIENT
Start: 2021-03-11 | End: 2021-03-11 | Stop reason: SURG

## 2021-03-11 RX ORDER — DEXAMETHASONE SODIUM PHOSPHATE 4 MG/ML
VIAL (ML) INJECTION AS NEEDED
Status: DISCONTINUED | OUTPATIENT
Start: 2021-03-11 | End: 2021-03-11 | Stop reason: SURG

## 2021-03-11 RX ORDER — ONDANSETRON 4 MG/1
4 TABLET, FILM COATED ORAL EVERY 8 HOURS PRN
Status: DISCONTINUED | OUTPATIENT
Start: 2021-03-11 | End: 2021-03-11

## 2021-03-11 RX ORDER — HYDROMORPHONE HYDROCHLORIDE 1 MG/ML
0.2 INJECTION, SOLUTION INTRAMUSCULAR; INTRAVENOUS; SUBCUTANEOUS EVERY 5 MIN PRN
Status: DISCONTINUED | OUTPATIENT
Start: 2021-03-11 | End: 2021-03-11

## 2021-03-11 RX ORDER — PROCHLORPERAZINE EDISYLATE 5 MG/ML
5 INJECTION INTRAMUSCULAR; INTRAVENOUS ONCE AS NEEDED
Status: DISCONTINUED | OUTPATIENT
Start: 2021-03-11 | End: 2021-03-11

## 2021-03-11 RX ORDER — ONDANSETRON 2 MG/ML
INJECTION INTRAMUSCULAR; INTRAVENOUS AS NEEDED
Status: DISCONTINUED | OUTPATIENT
Start: 2021-03-11 | End: 2021-03-11 | Stop reason: SURG

## 2021-03-11 RX ORDER — HYDROCODONE BITARTRATE AND ACETAMINOPHEN 5; 325 MG/1; MG/1
1 TABLET ORAL AS NEEDED
Status: DISCONTINUED | OUTPATIENT
Start: 2021-03-11 | End: 2021-03-11

## 2021-03-11 RX ORDER — ACETAMINOPHEN 500 MG
1000 TABLET ORAL ONCE
Status: COMPLETED | OUTPATIENT
Start: 2021-03-11 | End: 2021-03-11

## 2021-03-11 RX ORDER — ONDANSETRON 2 MG/ML
4 INJECTION INTRAMUSCULAR; INTRAVENOUS EVERY 8 HOURS PRN
Status: DISCONTINUED | OUTPATIENT
Start: 2021-03-11 | End: 2021-03-11

## 2021-03-11 RX ORDER — GLYCOPYRROLATE 0.2 MG/ML
INJECTION, SOLUTION INTRAMUSCULAR; INTRAVENOUS AS NEEDED
Status: DISCONTINUED | OUTPATIENT
Start: 2021-03-11 | End: 2021-03-11 | Stop reason: SURG

## 2021-03-11 RX ORDER — METOCLOPRAMIDE 10 MG/1
10 TABLET ORAL ONCE
Status: COMPLETED | OUTPATIENT
Start: 2021-03-11 | End: 2021-03-11

## 2021-03-11 RX ORDER — ONDANSETRON 2 MG/ML
4 INJECTION INTRAMUSCULAR; INTRAVENOUS ONCE AS NEEDED
Status: DISCONTINUED | OUTPATIENT
Start: 2021-03-11 | End: 2021-03-11

## 2021-03-11 RX ORDER — FAMOTIDINE 20 MG/1
20 TABLET ORAL ONCE
Status: COMPLETED | OUTPATIENT
Start: 2021-03-11 | End: 2021-03-11

## 2021-03-11 RX ORDER — MORPHINE SULFATE 4 MG/ML
2 INJECTION, SOLUTION INTRAMUSCULAR; INTRAVENOUS EVERY 10 MIN PRN
Status: DISCONTINUED | OUTPATIENT
Start: 2021-03-11 | End: 2021-03-11

## 2021-03-11 RX ORDER — HYDROMORPHONE HYDROCHLORIDE 1 MG/ML
0.4 INJECTION, SOLUTION INTRAMUSCULAR; INTRAVENOUS; SUBCUTANEOUS EVERY 5 MIN PRN
Status: DISCONTINUED | OUTPATIENT
Start: 2021-03-11 | End: 2021-03-11

## 2021-03-11 RX ORDER — MORPHINE SULFATE 10 MG/ML
6 INJECTION, SOLUTION INTRAMUSCULAR; INTRAVENOUS EVERY 10 MIN PRN
Status: DISCONTINUED | OUTPATIENT
Start: 2021-03-11 | End: 2021-03-11

## 2021-03-11 RX ORDER — MORPHINE SULFATE 4 MG/ML
4 INJECTION, SOLUTION INTRAMUSCULAR; INTRAVENOUS EVERY 10 MIN PRN
Status: DISCONTINUED | OUTPATIENT
Start: 2021-03-11 | End: 2021-03-11

## 2021-03-11 RX ORDER — SODIUM CHLORIDE, SODIUM LACTATE, POTASSIUM CHLORIDE, CALCIUM CHLORIDE 600; 310; 30; 20 MG/100ML; MG/100ML; MG/100ML; MG/100ML
INJECTION, SOLUTION INTRAVENOUS CONTINUOUS
Status: DISCONTINUED | OUTPATIENT
Start: 2021-03-11 | End: 2021-03-11

## 2021-03-11 RX ORDER — HYDROCODONE BITARTRATE AND ACETAMINOPHEN 5; 325 MG/1; MG/1
2 TABLET ORAL AS NEEDED
Status: DISCONTINUED | OUTPATIENT
Start: 2021-03-11 | End: 2021-03-11

## 2021-03-11 RX ORDER — NALOXONE HYDROCHLORIDE 0.4 MG/ML
80 INJECTION, SOLUTION INTRAMUSCULAR; INTRAVENOUS; SUBCUTANEOUS AS NEEDED
Status: DISCONTINUED | OUTPATIENT
Start: 2021-03-11 | End: 2021-03-11

## 2021-03-11 RX ORDER — HALOPERIDOL 5 MG/ML
0.25 INJECTION INTRAMUSCULAR ONCE AS NEEDED
Status: DISCONTINUED | OUTPATIENT
Start: 2021-03-11 | End: 2021-03-11

## 2021-03-11 RX ORDER — METOPROLOL TARTRATE 5 MG/5ML
2.5 INJECTION INTRAVENOUS ONCE
Status: DISCONTINUED | OUTPATIENT
Start: 2021-03-11 | End: 2021-03-11

## 2021-03-11 RX ORDER — IBUPROFEN 600 MG/1
600 TABLET ORAL EVERY 6 HOURS
Status: DISCONTINUED | OUTPATIENT
Start: 2021-03-11 | End: 2021-03-11

## 2021-03-11 RX ORDER — HYDROMORPHONE HYDROCHLORIDE 1 MG/ML
0.6 INJECTION, SOLUTION INTRAMUSCULAR; INTRAVENOUS; SUBCUTANEOUS EVERY 5 MIN PRN
Status: DISCONTINUED | OUTPATIENT
Start: 2021-03-11 | End: 2021-03-11

## 2021-03-11 RX ADMIN — DEXAMETHASONE SODIUM PHOSPHATE 4 MG: 4 MG/ML VIAL (ML) INJECTION at 13:17:00

## 2021-03-11 RX ADMIN — ONDANSETRON 4 MG: 2 INJECTION INTRAMUSCULAR; INTRAVENOUS at 13:17:00

## 2021-03-11 RX ADMIN — KETOROLAC TROMETHAMINE 30 MG: 30 INJECTION, SOLUTION INTRAMUSCULAR; INTRAVENOUS at 13:57:00

## 2021-03-11 RX ADMIN — SODIUM CHLORIDE, SODIUM LACTATE, POTASSIUM CHLORIDE, CALCIUM CHLORIDE: 600; 310; 30; 20 INJECTION, SOLUTION INTRAVENOUS at 13:14:00

## 2021-03-11 RX ADMIN — GLYCOPYRROLATE 0.2 MG: 0.2 INJECTION, SOLUTION INTRAMUSCULAR; INTRAVENOUS at 13:17:00

## 2021-03-11 NOTE — DISCHARGE SUMMARY
[facility] HOSP - [facility]    Discharge Summary    Vel La Patient Status:  Hospital Outpatient Surgery    1960 MRN [id number]   Location [address] UNIT Attending Breanne Obrien MD   Hosp Day # 0 PCP Mckenzie Singer weeks    Follow up: Follow-up Information     Follow up In 2 weeks.     Why: post op visit                     Other Discharge Instructions:        HOME INSTRUCTIONS  AMBSURG HOME CARE INSTRUCTIONS: POST-OP ANESTHESIA  The medication that you received f should resolve in about 24 hours    If you have a problem when you are at home:    · Call your surgeons office         Trupti Li.  Page  3/11/2021

## 2021-03-11 NOTE — H&P
[address] [place] [address]  Patient Status:  Hospital Outpatient Surgery    1960 MRN [id number]   Location [facility] Attending Annamaria Dance, MD   Hosp Day # 0 PCP Ariana Rios MD • BREAST BIOPSY NEEDLE LOCALIZATION Left 1/9/2014    Performed by Tara Amador MD at [facility] MAIN OR   • CHOLECYSTECTOMY      2004   • COLONOSCOPY N/A 7/9/2018    Performed by Prema Horne MD at [facility] ENDOSCOPY   • ESOPHAGOGASTRODUODENOSCOPY (EGD) N/A 7/9/ as needed. PANTOPRAZOLE SODIUM 40 MG Oral Tab EC, TAKE 1 TABLET DAILY (Patient taking differently: Take 40 mg by mouth every other day. Alternate with pepcid )  Calcium Carbonate Antacid (TUMS) 500 MG Oral Chew Tab, Chew 1 tablet by mouth as needed.   Mu used to better evaluate adnexal and endometrial detail.       PATIENT STATED HISTORY: (As transcribed by Technologist)              FINDINGS:                  UTERUS:  9.1 x 6.4 x 4.7 cm.  Endometrial thickness is 2.1 cm with cystic structures in heterogen

## 2021-03-11 NOTE — ANESTHESIA PROCEDURE NOTES
Airway  Date/Time: 3/11/2021 1:18 PM  Urgency: elective    Airway not difficult    General Information and Staff    Patient location during procedure: OR  Anesthesiologist: Ruby Pacheco MD  Performed: anesthesiologist     Indications and Patient Co

## 2021-03-11 NOTE — ANESTHESIA PREPROCEDURE EVALUATION
Anesthesia PreOp Note    HPI:     Yen May is a 57year old female who presents for preoperative consultation requested by: Diamond Moreira MD    Date of Surgery: 3/11/2021    Procedure(s):  Hysteroscopy Surgical, Dilation and Curettage, with Manoj Tinnitus    • Unifocal PVCs     no medication   • Varicose vein    • Visual impairment     readers       Past Surgical History:   Procedure Laterality Date   • APPENDECTOMY  07/08/2019   • BREAST BIOPSY NEEDLE LOCALIZATION Left 1/9/2014    Performed by [facility] Cap, Take 1,000 mg by mouth daily.   , Disp: , Rfl: , Past Week at Unknown time      lactated ringers infusion, , Intravenous, Continuous, Cabrera Bartholomew MD, Last Rate: 20 mL/hr at 03/11/21 1224, New Bag at 03/11/21 1224  metoprolol Tartrate (LOPRESSOR) t Asked        Self-Exams: Not Asked    Social History Narrative      Not on file    Social Determinants of Health  Financial Resource Strain:       Difficulty of Paying Living Expenses:   Food Insecurity:       Worried About [address] in the Last Evaluation     Patient summary reviewed and Nursing notes reviewed    History of anesthetic complications   Airway   Mallampati: II  TM distance: >3 FB  Neck ROM: full  Dental - normal exam     Pulmonary - negative ROS and normal exam   Cardiovascular - no

## 2021-03-11 NOTE — ANESTHESIA POSTPROCEDURE EVALUATION
Patient: Walter Young    Procedure Summary     Date: 03/11/21 Room / Location: [address] [place] [address] MAIN OR 01 / [address] [facility] MAIN OR    Anesthesia Start: 0069 Anesthesia Stop:     Procedure: Hysteroscopy Surgical, Dilation and Curettage, with Mysconnie (N/A ) Diagnosis:        Thick

## 2021-03-11 NOTE — OPERATIVE REPORT
[facility] OPERATING ROOM  Operative Note     [facility] Location: OR   [facility] [id number] MRN [id number]   Admission Date 3/11/2021 Operation Date 3/11/2021   Attending Physician Danielle Reddy MD Operating Physician Maira Garcia.  MD Allison Harris good condition. All sponge, needle count were correct x 2. Humaira Harris MD  3/11/2021  1:59 PM

## 2021-03-12 DIAGNOSIS — Z23 NEED FOR VACCINATION: ICD-10-CM

## 2021-03-18 RX ORDER — ATENOLOL 25 MG/1
25 TABLET ORAL 2 TIMES DAILY
Qty: 180 TABLET | Refills: 3 | Status: SHIPPED | OUTPATIENT
Start: 2021-03-18

## 2021-05-25 VITALS
HEART RATE: 68 BPM | SYSTOLIC BLOOD PRESSURE: 128 MMHG | DIASTOLIC BLOOD PRESSURE: 72 MMHG | BODY MASS INDEX: 39.68 KG/M2 | WEIGHT: 210 LBS

## 2021-08-28 ENCOUNTER — IMMUNIZATION (OUTPATIENT)
Dept: LAB | Facility: HOSPITAL | Age: 61
End: 2021-08-28
Attending: EMERGENCY MEDICINE
Payer: COMMERCIAL

## 2021-08-28 DIAGNOSIS — Z23 NEED FOR VACCINATION: Primary | ICD-10-CM

## 2021-08-28 PROCEDURE — 0001A SARSCOV2 VAC 30MCG/0.3ML IM: CPT

## 2021-09-13 ENCOUNTER — APPOINTMENT (OUTPATIENT)
Dept: CARDIOLOGY | Age: 61
End: 2021-09-13

## 2021-09-19 ENCOUNTER — IMMUNIZATION (OUTPATIENT)
Dept: LAB | Facility: HOSPITAL | Age: 61
End: 2021-09-19
Attending: EMERGENCY MEDICINE
Payer: COMMERCIAL

## 2021-09-19 DIAGNOSIS — Z23 NEED FOR VACCINATION: Primary | ICD-10-CM

## 2021-09-19 PROCEDURE — 0002A SARSCOV2 VAC 30MCG/0.3ML IM: CPT

## 2022-01-09 ENCOUNTER — TELEPHONE (OUTPATIENT)
Dept: INTERNAL MEDICINE CLINIC | Facility: HOSPITAL | Age: 62
End: 2022-01-09

## 2022-01-09 NOTE — TELEPHONE ENCOUNTER
Outside Covid Testing done    Results and RTW guidelines:    COVID RESULT reported:      Test type:    [x] Rapid outside         [] PCR outside       [] Home Test    Date of test: 1/8/22     Test location: [address] [place] [address]       [] Result viewed in Epic with verbal con COVID    5.  Still has a fever, vomiting or diarrhea   - Keep communication open with management about RTW and if symptoms worsen     Notes:     RTW PLAN:    []  If COVID positive results, off work minimum of 5 days from positive test or onset of symptoms ( 8/28/21, 9/21/21      Which vaccine:  Pfizer [x]     Leobardo Duffy []    J&J []          Date of symptom onset: 1/1/22    SYMPTOMS:  [] asymptomatic    • Fever > 100F               Yes [x]          • Cough                          Yes [x]      • Shortness of br

## 2022-01-13 ENCOUNTER — APPOINTMENT (OUTPATIENT)
Dept: GENERAL RADIOLOGY | Age: 62
End: 2022-01-13
Attending: NURSE PRACTITIONER
Payer: COMMERCIAL

## 2022-01-13 ENCOUNTER — HOSPITAL ENCOUNTER (OUTPATIENT)
Age: 62
Discharge: HOME | End: 2022-01-13
Payer: COMMERCIAL

## 2022-01-13 VITALS
SYSTOLIC BLOOD PRESSURE: 166 MMHG | RESPIRATION RATE: 14 BRPM | OXYGEN SATURATION: 94 % | DIASTOLIC BLOOD PRESSURE: 83 MMHG | TEMPERATURE: 99 F | HEART RATE: 82 BPM

## 2022-01-13 DIAGNOSIS — U07.1 PNEUMONIA DUE TO COVID-19 VIRUS: Primary | ICD-10-CM

## 2022-01-13 DIAGNOSIS — J12.82 PNEUMONIA DUE TO COVID-19 VIRUS: Primary | ICD-10-CM

## 2022-01-13 PROCEDURE — 71046 X-RAY EXAM CHEST 2 VIEWS: CPT | Performed by: NURSE PRACTITIONER

## 2022-01-13 PROCEDURE — 99213 OFFICE O/P EST LOW 20 MIN: CPT

## 2022-01-13 NOTE — ED PROVIDER NOTES
Patient Seen in: [facility]      History   Patient presents with:  Shortness Of Breath  Covid    Stated Complaint: sob/back pain/cough - covid positive    Subjective:   57-year-old female presents to immediate care with cough, with interm OTHER SURGICAL HISTORY      Cyst on neck removal                Social History    Tobacco Use      Smoking status: Never Smoker      Smokeless tobacco: Never Used    Vaping Use      Vaping Use: Never used    Alcohol use: No      Alcohol/week: 0.0 standard chest radiographs were obtained. PATIENT STATED HISTORY: (As transcribed by Technologist)  Covid positive 1/1/2022 Still is short of breath.     FINDINGS:  LUNGS:  There is mild streaky airspace opacity at the left lung base which could represent atelectas

## 2022-01-13 NOTE — ED INITIAL ASSESSMENT (HPI)
Patient states tested positive for CV-19 on 01/08/2022    Patient states last 2 days felt slight SOB- lowest oxygen saturation has been 94% at home  Non productive cough continues    Patient measures home oxygen sats at 94-97%

## 2022-04-16 ENCOUNTER — HOSPITAL ENCOUNTER (OUTPATIENT)
Dept: MAMMOGRAPHY | Facility: HOSPITAL | Age: 62
Discharge: HOME | End: 2022-04-16
Attending: FAMILY MEDICINE
Payer: COMMERCIAL

## 2022-04-16 DIAGNOSIS — Z00.01 ABNORMAL PHYSICAL EVALUATION: ICD-10-CM

## 2022-04-16 PROCEDURE — 77067 SCR MAMMO BI INCL CAD: CPT | Performed by: FAMILY MEDICINE

## 2022-04-16 PROCEDURE — 77063 BREAST TOMOSYNTHESIS BI: CPT | Performed by: FAMILY MEDICINE

## 2022-05-03 ENCOUNTER — TELEPHONE (OUTPATIENT)
Dept: MAMMOGRAPHY | Facility: HOSPITAL | Age: 62
End: 2022-05-03

## 2022-05-03 ENCOUNTER — HOSPITAL ENCOUNTER (OUTPATIENT)
Dept: MAMMOGRAPHY | Facility: HOSPITAL | Age: 62
Discharge: HOME | End: 2022-05-03
Attending: FAMILY MEDICINE
Payer: COMMERCIAL

## 2022-05-03 DIAGNOSIS — R92.2 INCONCLUSIVE MAMMOGRAM: ICD-10-CM

## 2022-05-03 PROCEDURE — 76642 ULTRASOUND BREAST LIMITED: CPT | Performed by: FAMILY MEDICINE

## 2022-05-03 PROCEDURE — 77065 DX MAMMO INCL CAD UNI: CPT | Performed by: FAMILY MEDICINE

## 2022-05-03 PROCEDURE — 77061 BREAST TOMOSYNTHESIS UNI: CPT | Performed by: FAMILY MEDICINE

## 2022-05-03 NOTE — TELEPHONE ENCOUNTER
Attempted to call patient re: breast biopsy recommendation screening and instructions. Message left for patient to call back.

## 2022-05-04 ENCOUNTER — TELEPHONE (OUTPATIENT)
Dept: MAMMOGRAPHY | Facility: HOSPITAL | Age: 62
End: 2022-05-04

## 2022-05-04 NOTE — TELEPHONE ENCOUNTER
This [facility] RN phoned pt re right breast 1 site stereotactic biopsy recommendation by Dr. Jc Tran for asymmetry. Procedure reviewed and all questions answered. Emotional support given. Confirmed pt did receive educational handouts and reviewed the handouts with the patient. On the day of the biopsy, pt instructed to take Tylenol 1000mg PO, eat a light meal & bring or wear a sports bra. Post biopsy care also reviewed with pt to include NO lifting more than 5lbs, no exercising or housework (limit upper body movement) for 24-48 hrs post biopsy. Patient denies blood thinners, bleeding disorders, liver disease, and chemo. Pt verbalized understanding. Our breast center schedulers will be calling to schedule an appt that is convenient for pt.

## 2022-05-12 ENCOUNTER — HOSPITAL ENCOUNTER (OUTPATIENT)
Dept: MAMMOGRAPHY | Facility: HOSPITAL | Age: 62
Discharge: HOME | End: 2022-05-12
Attending: FAMILY MEDICINE
Payer: COMMERCIAL

## 2022-05-12 DIAGNOSIS — N64.89 BREAST ASYMMETRY: ICD-10-CM

## 2022-05-12 PROCEDURE — 19082 BX BREAST ADD LESION STRTCTC: CPT | Performed by: FAMILY MEDICINE

## 2022-05-12 PROCEDURE — 19081 BX BREAST 1ST LESION STRTCTC: CPT | Performed by: FAMILY MEDICINE

## 2022-05-12 PROCEDURE — 88305 TISSUE EXAM BY PATHOLOGIST: CPT

## 2022-05-17 ENCOUNTER — TELEPHONE (OUTPATIENT)
Dept: MAMMOGRAPHY | Facility: HOSPITAL | Age: 62
End: 2022-05-17

## 2022-05-17 NOTE — TELEPHONE ENCOUNTER
Telephoned Estephania Beach and name,  verified with patient. Notified Estephania Beach of right breast 2 site negative for malignancy biopsy result. Concordance pending. Estephania Beach reports biopsy site is healing well. Radiologist recommends next mammogram is due in 6 months for follow up. Pt verbalized understanding and had no further questions at this time.

## 2022-11-01 ENCOUNTER — OFFICE VISIT (OUTPATIENT)
Dept: INTERNAL MEDICINE CLINIC | Facility: CLINIC | Age: 62
End: 2022-11-01
Payer: COMMERCIAL

## 2022-11-01 DIAGNOSIS — Z02.9 ADMINISTRATIVE ENCOUNTER: Primary | ICD-10-CM

## 2022-12-30 ENCOUNTER — HOSPITAL ENCOUNTER (OUTPATIENT)
Dept: MAMMOGRAPHY | Age: 62
Discharge: HOME | End: 2022-12-30
Attending: FAMILY MEDICINE
Payer: COMMERCIAL

## 2022-12-30 DIAGNOSIS — R92.8 ABNORMAL MAMMOGRAM OF RIGHT BREAST: ICD-10-CM

## 2022-12-30 PROCEDURE — 77061 BREAST TOMOSYNTHESIS UNI: CPT | Performed by: FAMILY MEDICINE

## 2022-12-30 PROCEDURE — 77065 DX MAMMO INCL CAD UNI: CPT | Performed by: FAMILY MEDICINE

## 2023-01-01 ENCOUNTER — OFFICE VISIT (OUTPATIENT)
Dept: FAMILY MEDICINE CLINIC | Facility: CLINIC | Age: 63
End: 2023-01-01
Payer: COMMERCIAL

## 2023-01-01 VITALS
TEMPERATURE: 99 F | WEIGHT: 230 LBS | RESPIRATION RATE: 18 BRPM | HEART RATE: 82 BPM | HEIGHT: 62 IN | SYSTOLIC BLOOD PRESSURE: 118 MMHG | OXYGEN SATURATION: 98 % | BODY MASS INDEX: 42.33 KG/M2 | DIASTOLIC BLOOD PRESSURE: 72 MMHG

## 2023-01-01 DIAGNOSIS — R09.89 CHEST CONGESTION: Primary | ICD-10-CM

## 2023-01-01 DIAGNOSIS — J06.9 VIRAL URI: ICD-10-CM

## 2023-01-10 ENCOUNTER — APPOINTMENT (OUTPATIENT)
Dept: GENERAL RADIOLOGY | Age: 63
End: 2023-01-10
Attending: PHYSICIAN ASSISTANT
Payer: COMMERCIAL

## 2023-01-10 ENCOUNTER — HOSPITAL ENCOUNTER (OUTPATIENT)
Age: 63
Discharge: HOME | End: 2023-01-10
Payer: COMMERCIAL

## 2023-01-10 VITALS
BODY MASS INDEX: 42 KG/M2 | DIASTOLIC BLOOD PRESSURE: 72 MMHG | WEIGHT: 230 LBS | HEART RATE: 70 BPM | SYSTOLIC BLOOD PRESSURE: 141 MMHG | OXYGEN SATURATION: 94 % | TEMPERATURE: 98 F | RESPIRATION RATE: 18 BRPM

## 2023-01-10 DIAGNOSIS — J20.9 ACUTE BRONCHITIS, UNSPECIFIED ORGANISM: ICD-10-CM

## 2023-01-10 DIAGNOSIS — J98.01 ACUTE BRONCHOSPASM: ICD-10-CM

## 2023-01-10 DIAGNOSIS — J01.40 ACUTE NON-RECURRENT PANSINUSITIS: Primary | ICD-10-CM

## 2023-01-10 LAB — SARS-COV-2 RNA RESP QL NAA+PROBE: NOT DETECTED

## 2023-01-10 PROCEDURE — 71046 X-RAY EXAM CHEST 2 VIEWS: CPT | Performed by: PHYSICIAN ASSISTANT

## 2023-01-10 PROCEDURE — 94640 AIRWAY INHALATION TREATMENT: CPT

## 2023-01-10 PROCEDURE — 99214 OFFICE O/P EST MOD 30 MIN: CPT

## 2023-01-10 RX ORDER — PREDNISONE 20 MG/1
40 TABLET ORAL DAILY
Qty: 10 TABLET | Refills: 0 | Status: SHIPPED | OUTPATIENT
Start: 2023-01-10 | End: 2023-01-15

## 2023-01-10 RX ORDER — PREDNISONE 20 MG/1
60 TABLET ORAL ONCE
Status: COMPLETED | OUTPATIENT
Start: 2023-01-10 | End: 2023-01-10

## 2023-01-10 RX ORDER — ALBUTEROL SULFATE 90 UG/1
4 AEROSOL, METERED RESPIRATORY (INHALATION) ONCE
Status: COMPLETED | OUTPATIENT
Start: 2023-01-10 | End: 2023-01-10

## 2023-01-10 RX ORDER — AMOXICILLIN AND CLAVULANATE POTASSIUM 875; 125 MG/1; MG/1
1 TABLET, FILM COATED ORAL 2 TIMES DAILY
Qty: 14 TABLET | Refills: 0 | Status: SHIPPED | OUTPATIENT
Start: 2023-01-10 | End: 2023-01-17

## 2023-01-10 NOTE — DISCHARGE INSTRUCTIONS
I do believe this is likely all viral if no improvement in your symptoms over the next 72 hours with prednisone and using albuterol inhaler 1 to 2 puffs every 4-6 hours then start on oral Augmentin    If you do start on antibiotics take probiotic    While taking antibiotics it is recommended that you also take an over the counter probiotics. If you'd like, you can have 2 servings of yogurt/Kefir daily instead. Probiotics increase the good bacteria in your gut while the antibiotic fights the bad bacteria that is causing your infection. The good bacteria in your gut act as part of your immune system. Taking a probiotic while on antibiotics will decrease the chances of upset stomach and diarrhea, which are common side effects of antibiotics.

## 2023-01-10 NOTE — ED INITIAL ASSESSMENT (HPI)
Patient presents to IC with c/o cough sometimes productive x 12 days. +Sinus congestion/pressure. Low grade fever.

## 2023-02-09 ENCOUNTER — OFFICE VISIT (OUTPATIENT)
Dept: INTERNAL MEDICINE CLINIC | Facility: CLINIC | Age: 63
End: 2023-02-09
Payer: COMMERCIAL

## 2023-02-09 VITALS
HEIGHT: 62 IN | BODY MASS INDEX: 43.24 KG/M2 | SYSTOLIC BLOOD PRESSURE: 122 MMHG | HEART RATE: 67 BPM | OXYGEN SATURATION: 95 % | RESPIRATION RATE: 16 BRPM | DIASTOLIC BLOOD PRESSURE: 80 MMHG | WEIGHT: 235 LBS

## 2023-02-09 DIAGNOSIS — R73.01 IFG (IMPAIRED FASTING GLUCOSE): ICD-10-CM

## 2023-02-09 DIAGNOSIS — Z51.81 ENCOUNTER FOR THERAPEUTIC DRUG MONITORING: Primary | ICD-10-CM

## 2023-02-09 DIAGNOSIS — E66.01 CLASS 3 SEVERE OBESITY WITH SERIOUS COMORBIDITY AND BODY MASS INDEX (BMI) OF 40.0 TO 44.9 IN ADULT, UNSPECIFIED OBESITY TYPE (HCC): ICD-10-CM

## 2023-02-09 DIAGNOSIS — E78.2 MIXED HYPERLIPIDEMIA: ICD-10-CM

## 2023-02-09 DIAGNOSIS — F41.9 ANXIETY: ICD-10-CM

## 2023-02-09 PROBLEM — E66.813 CLASS 3 SEVERE OBESITY WITH SERIOUS COMORBIDITY AND BODY MASS INDEX (BMI) OF 40.0 TO 44.9 IN ADULT (HCC): Status: ACTIVE | Noted: 2023-02-09

## 2023-02-09 PROBLEM — E66.813 CLASS 3 SEVERE OBESITY WITH SERIOUS COMORBIDITY AND BODY MASS INDEX (BMI) OF 40.0 TO 44.9 IN ADULT: Status: ACTIVE | Noted: 2023-02-09

## 2023-02-09 PROCEDURE — 3074F SYST BP LT 130 MM HG: CPT | Performed by: NURSE PRACTITIONER

## 2023-02-09 PROCEDURE — 3008F BODY MASS INDEX DOCD: CPT | Performed by: NURSE PRACTITIONER

## 2023-02-09 PROCEDURE — 99214 OFFICE O/P EST MOD 30 MIN: CPT | Performed by: NURSE PRACTITIONER

## 2023-02-09 PROCEDURE — 3079F DIAST BP 80-89 MM HG: CPT | Performed by: NURSE PRACTITIONER

## 2023-02-09 NOTE — PATIENT INSTRUCTIONS
Welcome to the [facility]...your Lifestyle Renovation begins now! Thank you for placing your trust in our health care team, I look forward to working with you along this journey to better health! Next steps:     1. Call our office at [phone] to schedule a personal nutrition consultation with one of our registered dieticians. Bring along your food journal (3 days minimum). See journal options below. 2.  Complete non fasting labs at THE [facility] lab site prior to next office visit. 3.  Fill your prescribed medication and take as discussed and prescribed: None at this time, start with lifestyle intervention. Recommend Wegovy (www.SellrBuyr Free Classifieds [place]. com) if affordable, otherwise Contrave could be an option. 4.  Complete additional testing as ordered: Anticipate contact regarding [address] [facility].    Please try to work on the following dietary changes this first month:    1. Drink water with meals and throughout the day, cut down on soda and/or juice if consumed. Consider flavored water options like Bubbly, Spindrift, Hint and Antione. 2.  Eat breakfast daily and focus on having protein with each meal, examples include: greek yogurt, cottage cheese, hard boiled egg, whole grain toast with peanut butter. Daily protein recommendation to start: 90 grams. 3.  Reduce refined carbohydrates and sugars which includes items such as sweets, as well as rice, pasta, and bread and make sure to choose whole grain options when having them with just 1 serving per meal about the size of your inner palm. Daily carbohydrate recommendation to start: 125 grams. 4.  Consume non starchy veggies daily working towards making them a good 50% of your daily food intake. Add them to lunch and dinner consistently. 5.  Start a daily probiotic: VSL#3 is recommended, (order on line at www.vsl3. com). Take 1 capsule daily with water for 30 days, then reduce to 1 every other day (this will reduce the cost). Capsules can be left out for 2 weeks, but then must be refrigerated. Please download willam My Fitness Orvel Dubin! Or Net Diary to monitor daily dietary intake and you will be able to see if you are eating the right amount of calories or too much or too little which would hinder weight loss. Additionally this will help to see your daily carbohydrate and protein intake. When you set the willam up choose 1-2 lbs/week as a goal.  Keeping a paper food journal is an option as well to remain accountable for your choices- this is the start to mindful eating! A low calorie diet has been consistently shown to support weight loss. If you are using paper to log your nutrition I recommend your daily calorie intake to start: 1300. Continue or start exercising to help establish a routine. If not already exercising begin with 1 day and progress as able with long-term goal of 30 minutes 5 days a week at a minimum. Meditation daily can help manage and control stress. Chronic stress can make weight loss difficult. Exercising is one way to help with stress, but meditation using the CALM Willam or another comparable alternative can be done in your home or place of work with little time commitment. This Willam can also help work on behavior change and improve sleep. Check out the segment under Calm Masterclass and listen to The 4 Pillars of Health. A great way to begin learning about the foundation of lifestyle with practical tips to use in your every day. Check out www.yourweightmatters. org blog for continued daily support and education along this weight loss journey! Patient Resources:    Personal Training/Fitness Classes/Health Coaching    [facility] and [place] @ [email or web address]/ Full fitness center with group fitness and personal training. Discount available as client of [facility].   Health Coaching and Personal Training with Geovanna Worrell at our [employer]- individual weekly coaching with option to add personal training and small group fitness classes targeted at weight loss- [phone] and/or email @ [email or web address]  360FIT Prakash @ [email or web address] Group Fitness [phone] and/or email Krystina Ornelas at [email or web address]  [address] [facility] with multiple locations: Aetna (www.BridgeXs), Eat The Exegy (www.Retia Medical), Fit Body Bootcamp (www.Image SocketbodyboSenSagep.Tank Top TV), In Hand Guides (www.Third Brigade), The Exercise  (www.exercisecoachHOTELbeat)    Online Fitness  Fitness  on [employer] in 10 DVD series   www. lsycu44XWF. Tank Top TV  Sit and Be Fit - Chair exercise series Www.sitandbefit. org  Hip Hop Fit with Denilson Goodson at www.hiphopfit. net    Apps for on the Directworks 7 Minute Workout (orange box with white 7) - free on the go HIIT training jaelyn  Peloton Jaelyn @ wwwGROU.PS    Nutrition Trackers and Tools  LoseIT! And My Fitness Pal apps and on line for tracking nutrition  NOOM - virtual health coaching  FitFoundation (healthy meals on the go) in [facility] @ www. aqfdwphfyaenh5x. Raquel Reyes MD @ www.MapMyIndiaomd.com and Devan Lezama (keto and low carb plans recommended) @ www. JCDBEM79.UW, Metabolic Meals @ www. MyMetabolicMeals. com - individual prepared meals to go  [employer] Sciences, BEETmobile, [employer], Every Plate, Sagent Pharmaceuticals- on line meal delivery programs for preparation at home  [employer] in Corinne Horseman for homemade meals to go @ www.mealvillage. com  Diet Doctor @ www. dietdoctor. com - low carb swaps  YuGotoTel - meal prep and planning jaelyn (www.yummly. com)    Stress Management/Behavior/Mindful Eating  CALM meditation jaelyn (www.calmMOTA Motors)  Headspace  Am I Hungry? Mindful eating virtual  jaelyn  Www.yourweightmatters. org - Obesity Action Coalition sponsored Blog posts daily  Motivation jaelyn (black box with white \")- daily supportive messages sent to your phone    Books/Video Education/Podcasts  Mindless Eating by Leny Gonzales  Why We Get Sick by Jacek Garrett (a book about insulin resistance)  Atomic Habits by Jennifer Melvin (a book about taking small steps to promote greater behavior change)   Can't Hurt Me by Marc Jorgensen (a book exploring the power of discipline in achieving your goals)  The End of Dieting: How to Live for Life by Dr. Rayshawn Bermeo M.D. or listen to The [facility] Episode 61: Understanding \"Nutritarian\" Eating w/Dr. Rayshawn Bermeo  Your Body in Balance: The OpenFin of Food, Hormones, and Health by Dr. Sylvia Breen  The Menopause Diet Plan by Malini Kamara and [facility] AT [facility]  The Complete Guide to fasting by Dr. Carlos Cowan, [facility] by Chris Dugan, Ph.D, R.D. Weight Loss Surgery Will Not Treat Food Addiction by Ari Stone Ph.D  The [address] [place] [address] on plant based nutrition  Fed Up - documentary about obesity (Free on [facility])  The Truth About Sugar - documentary on sugar (Free on Collective, https://youtu. be/1S8jpdcOC6m)  The Dr. Kallie Ramirez by Dr. Tomasa Jackson MD  Fitlosophy Fitspiration - journal to better health (found at [employer] in fitness aisle)  What Happened to You?- a look at the impact trauma has on behavior written by Eliz Preston and Dr. Leanne Horowitz Again by Terri Matthew - discovering your true self after trauma  Pretty Root talk on NetWOWash, The Call to Courage  Podcasts:  The Exam Room by the Physician's Committee, Nutrition Facts by Dr. Jesi Harkins

## 2023-02-24 ENCOUNTER — TELEMEDICINE (OUTPATIENT)
Dept: INTERNAL MEDICINE CLINIC | Facility: CLINIC | Age: 63
End: 2023-02-24
Payer: COMMERCIAL

## 2023-02-24 DIAGNOSIS — E66.01 CLASS 3 SEVERE OBESITY WITH SERIOUS COMORBIDITY AND BODY MASS INDEX (BMI) OF 40.0 TO 44.9 IN ADULT, UNSPECIFIED OBESITY TYPE (HCC): ICD-10-CM

## 2023-02-24 DIAGNOSIS — R73.01 IFG (IMPAIRED FASTING GLUCOSE): ICD-10-CM

## 2023-02-24 DIAGNOSIS — E78.2 MIXED HYPERLIPIDEMIA: ICD-10-CM

## 2023-02-24 PROCEDURE — 97802 MEDICAL NUTRITION INDIV IN: CPT

## 2023-02-24 NOTE — PATIENT INSTRUCTIONS
Goals: 1. Keep a food record, My Net Diary. Can pre-log food. 2.  Strive to consume at least 4-6 meals/snacks per day. Include protein and produce when you eat. Eat a variety of fruits/vegetables  Aim for 40-50 grams of protein per day. Try to keep the carbohydrates at 120 grams per day or less. 3.  Practice mindful eating strategies, use MyPlate method, chew food 20-30 times before swallowing, use smaller utensils or plates/bowls. Make the meals last 30 minutes. 4.  Aim for 64 oz per day of water. (La croix, decaf beverages, adding True Lemon, Crystal Light). 5.  Exercise and strength train with a goal of 30 minutes per day for exercise (for example,walking/gardening/housework). Strength training 10 minutes 3 days per week with resistance bands or hand weights.   Ex: Gym rat no more, sit and be fit, stand and be fit

## 2023-04-06 ENCOUNTER — TELEPHONE (OUTPATIENT)
Dept: INTERNAL MEDICINE CLINIC | Facility: CLINIC | Age: 63
End: 2023-04-06

## 2023-04-06 NOTE — TELEPHONE ENCOUNTER
Patient left a message that she is going to have labs in [place] tomorrow and she thought KW wanted some. They are in the computer for THE [facility] 2/9/23. I called to let her know and got voicemail. I left message that labs are in the computer system for THE [facility] from 2/9 and she can ask them to pull them up and draw those as well tomorrow.

## 2023-04-07 ENCOUNTER — LAB ENCOUNTER (OUTPATIENT)
Dept: LAB | Age: 63
End: 2023-04-07
Attending: NURSE PRACTITIONER
Payer: COMMERCIAL

## 2023-04-07 DIAGNOSIS — F41.9 ANXIETY: ICD-10-CM

## 2023-04-07 DIAGNOSIS — R73.01 IFG (IMPAIRED FASTING GLUCOSE): ICD-10-CM

## 2023-04-07 DIAGNOSIS — E66.01 CLASS 3 SEVERE OBESITY WITH SERIOUS COMORBIDITY AND BODY MASS INDEX (BMI) OF 40.0 TO 44.9 IN ADULT, UNSPECIFIED OBESITY TYPE (HCC): ICD-10-CM

## 2023-04-07 DIAGNOSIS — Z51.81 ENCOUNTER FOR THERAPEUTIC DRUG MONITORING: ICD-10-CM

## 2023-04-07 DIAGNOSIS — E78.2 MIXED HYPERLIPIDEMIA: ICD-10-CM

## 2023-04-07 LAB
ALBUMIN SERPL-MCNC: 3.7 G/DL (ref 3.4–5)
ALBUMIN/GLOB SERPL: 0.9 {RATIO} (ref 1–2)
ALP LIVER SERPL-CCNC: 69 U/L
ALT SERPL-CCNC: 40 U/L
ANION GAP SERPL CALC-SCNC: 5 MMOL/L (ref 0–18)
AST SERPL-CCNC: 28 U/L (ref 15–37)
BILIRUB SERPL-MCNC: 0.9 MG/DL (ref 0.1–2)
BUN BLD-MCNC: 16 MG/DL (ref 7–18)
CALCIUM BLD-MCNC: 9.7 MG/DL (ref 8.5–10.1)
CHLORIDE SERPL-SCNC: 109 MMOL/L (ref 98–112)
CO2 SERPL-SCNC: 27 MMOL/L (ref 21–32)
CREAT BLD-MCNC: 0.9 MG/DL
CRP SERPL HS-MCNC: 3.28 MG/L (ref ?–3)
FASTING STATUS PATIENT QL REPORTED: YES
GFR SERPLBLD BASED ON 1.73 SQ M-ARVRAT: 71 ML/MIN/1.73M2 (ref 60–?)
GLOBULIN PLAS-MCNC: 4.1 G/DL (ref 2.8–4.4)
GLUCOSE BLD-MCNC: 120 MG/DL (ref 70–99)
OSMOLALITY SERPL CALC.SUM OF ELEC: 294 MOSM/KG (ref 275–295)
POTASSIUM SERPL-SCNC: 4.3 MMOL/L (ref 3.5–5.1)
PROT SERPL-MCNC: 7.8 G/DL (ref 6.4–8.2)
SODIUM SERPL-SCNC: 141 MMOL/L (ref 136–145)
T4 FREE SERPL-MCNC: 0.9 NG/DL (ref 0.8–1.7)
TSI SER-ACNC: 1.49 MIU/ML (ref 0.36–3.74)
VIT B12 SERPL-MCNC: 400 PG/ML (ref 193–986)
VIT D+METAB SERPL-MCNC: 29.7 NG/ML (ref 30–100)

## 2023-04-07 PROCEDURE — 84439 ASSAY OF FREE THYROXINE: CPT

## 2023-04-07 PROCEDURE — 80061 LIPID PANEL: CPT

## 2023-04-07 PROCEDURE — 82607 VITAMIN B-12: CPT

## 2023-04-07 PROCEDURE — 82306 VITAMIN D 25 HYDROXY: CPT

## 2023-04-07 PROCEDURE — 86141 C-REACTIVE PROTEIN HS: CPT

## 2023-04-07 PROCEDURE — 80053 COMPREHEN METABOLIC PANEL: CPT

## 2023-04-07 PROCEDURE — 84443 ASSAY THYROID STIM HORMONE: CPT

## 2023-04-07 PROCEDURE — 83036 HEMOGLOBIN GLYCOSYLATED A1C: CPT

## 2023-04-09 DIAGNOSIS — R73.01 IFG (IMPAIRED FASTING GLUCOSE): ICD-10-CM

## 2023-04-09 DIAGNOSIS — Z51.81 ENCOUNTER FOR THERAPEUTIC DRUG MONITORING: ICD-10-CM

## 2023-04-09 DIAGNOSIS — E78.2 MIXED HYPERLIPIDEMIA: Primary | ICD-10-CM

## 2023-04-09 DIAGNOSIS — E66.01 CLASS 3 SEVERE OBESITY WITH SERIOUS COMORBIDITY AND BODY MASS INDEX (BMI) OF 40.0 TO 44.9 IN ADULT, UNSPECIFIED OBESITY TYPE (HCC): ICD-10-CM

## 2023-04-09 LAB
CHOLEST SERPL-MCNC: 207 MG/DL (ref ?–200)
HDLC SERPL-MCNC: 47 MG/DL (ref 40–59)
LDLC SERPL CALC-MCNC: 136 MG/DL (ref ?–100)
NONHDLC SERPL-MCNC: 160 MG/DL (ref ?–130)
TRIGL SERPL-MCNC: 133 MG/DL (ref 30–149)
VLDLC SERPL CALC-MCNC: 24 MG/DL (ref 0–30)

## 2023-04-11 ENCOUNTER — TELEMEDICINE (OUTPATIENT)
Dept: INTERNAL MEDICINE CLINIC | Facility: CLINIC | Age: 63
End: 2023-04-11
Payer: COMMERCIAL

## 2023-04-11 DIAGNOSIS — Z51.81 ENCOUNTER FOR THERAPEUTIC DRUG MONITORING: Primary | ICD-10-CM

## 2023-04-11 DIAGNOSIS — E78.2 MIXED HYPERLIPIDEMIA: ICD-10-CM

## 2023-04-11 DIAGNOSIS — R73.01 IFG (IMPAIRED FASTING GLUCOSE): ICD-10-CM

## 2023-04-11 DIAGNOSIS — E66.01 CLASS 3 SEVERE OBESITY WITH SERIOUS COMORBIDITY AND BODY MASS INDEX (BMI) OF 40.0 TO 44.9 IN ADULT, UNSPECIFIED OBESITY TYPE (HCC): ICD-10-CM

## 2023-04-11 LAB — HGBA1C: 5.3 %

## 2023-04-11 PROCEDURE — 99213 OFFICE O/P EST LOW 20 MIN: CPT | Performed by: NURSE PRACTITIONER

## 2023-10-26 ENCOUNTER — OFFICE VISIT (OUTPATIENT)
Dept: INTERNAL MEDICINE CLINIC | Facility: CLINIC | Age: 63
End: 2023-10-26

## 2023-10-26 VITALS
HEIGHT: 62 IN | HEART RATE: 64 BPM | BODY MASS INDEX: 41.77 KG/M2 | WEIGHT: 227 LBS | SYSTOLIC BLOOD PRESSURE: 134 MMHG | DIASTOLIC BLOOD PRESSURE: 70 MMHG | RESPIRATION RATE: 16 BRPM

## 2023-10-26 DIAGNOSIS — Z51.81 ENCOUNTER FOR THERAPEUTIC DRUG MONITORING: Primary | ICD-10-CM

## 2023-10-26 DIAGNOSIS — E78.2 MIXED HYPERLIPIDEMIA: ICD-10-CM

## 2023-10-26 DIAGNOSIS — E66.01 CLASS 3 SEVERE OBESITY WITH SERIOUS COMORBIDITY AND BODY MASS INDEX (BMI) OF 40.0 TO 44.9 IN ADULT, UNSPECIFIED OBESITY TYPE (HCC): ICD-10-CM

## 2023-10-26 PROCEDURE — 3078F DIAST BP <80 MM HG: CPT | Performed by: NURSE PRACTITIONER

## 2023-10-26 PROCEDURE — 3075F SYST BP GE 130 - 139MM HG: CPT | Performed by: NURSE PRACTITIONER

## 2023-10-26 PROCEDURE — 99213 OFFICE O/P EST LOW 20 MIN: CPT | Performed by: NURSE PRACTITIONER

## 2023-10-26 PROCEDURE — 3008F BODY MASS INDEX DOCD: CPT | Performed by: NURSE PRACTITIONER

## 2023-10-26 RX ORDER — PAROXETINE 10 MG/1
TABLET, FILM COATED ORAL
COMMUNITY
Start: 2023-09-05

## 2023-12-09 ENCOUNTER — LABORATORY ENCOUNTER (OUTPATIENT)
Dept: LAB | Facility: HOSPITAL | Age: 63
End: 2023-12-09
Attending: SURGERY
Payer: COMMERCIAL

## 2023-12-09 DIAGNOSIS — E66.01 CLASS 3 SEVERE OBESITY WITH SERIOUS COMORBIDITY AND BODY MASS INDEX (BMI) OF 40.0 TO 44.9 IN ADULT, UNSPECIFIED OBESITY TYPE (HCC): ICD-10-CM

## 2023-12-09 DIAGNOSIS — E78.2 MIXED HYPERLIPIDEMIA: ICD-10-CM

## 2023-12-09 DIAGNOSIS — Z01.818 PRE-OP TESTING: ICD-10-CM

## 2023-12-09 DIAGNOSIS — Z51.81 ENCOUNTER FOR THERAPEUTIC DRUG MONITORING: ICD-10-CM

## 2023-12-09 LAB
ANION GAP SERPL CALC-SCNC: 5 MMOL/L (ref 0–18)
BASOPHILS # BLD AUTO: 0.06 X10(3) UL (ref 0–0.2)
BASOPHILS NFR BLD AUTO: 1 %
BUN BLD-MCNC: 14 MG/DL (ref 9–23)
CALCIUM BLD-MCNC: 9.1 MG/DL (ref 8.5–10.1)
CHLORIDE SERPL-SCNC: 109 MMOL/L (ref 98–112)
CO2 SERPL-SCNC: 27 MMOL/L (ref 21–32)
CREAT BLD-MCNC: 0.95 MG/DL
EGFRCR SERPLBLD CKD-EPI 2021: 67 ML/MIN/1.73M2 (ref 60–?)
EOSINOPHIL # BLD AUTO: 0.06 X10(3) UL (ref 0–0.7)
EOSINOPHIL NFR BLD AUTO: 1 %
ERYTHROCYTE [DISTWIDTH] IN BLOOD BY AUTOMATED COUNT: 12.7 %
FASTING STATUS PATIENT QL REPORTED: YES
GLUCOSE BLD-MCNC: 110 MG/DL (ref 70–99)
HCT VFR BLD AUTO: 41.8 %
HGB BLD-MCNC: 14 G/DL
IMM GRANULOCYTES # BLD AUTO: 0.01 X10(3) UL (ref 0–1)
IMM GRANULOCYTES NFR BLD: 0.2 %
LYMPHOCYTES # BLD AUTO: 2.07 X10(3) UL (ref 1–4)
LYMPHOCYTES NFR BLD AUTO: 33.1 %
MCH RBC QN AUTO: 30.6 PG (ref 26–34)
MCHC RBC AUTO-ENTMCNC: 33.5 G/DL (ref 31–37)
MCV RBC AUTO: 91.5 FL
MONOCYTES # BLD AUTO: 0.6 X10(3) UL (ref 0.1–1)
MONOCYTES NFR BLD AUTO: 9.6 %
NEUTROPHILS # BLD AUTO: 3.45 X10 (3) UL (ref 1.5–7.7)
NEUTROPHILS # BLD AUTO: 3.45 X10(3) UL (ref 1.5–7.7)
NEUTROPHILS NFR BLD AUTO: 55.1 %
OSMOLALITY SERPL CALC.SUM OF ELEC: 293 MOSM/KG (ref 275–295)
PLATELET # BLD AUTO: 274 10(3)UL (ref 150–450)
POTASSIUM SERPL-SCNC: 4.1 MMOL/L (ref 3.5–5.1)
RBC # BLD AUTO: 4.57 X10(6)UL
SODIUM SERPL-SCNC: 141 MMOL/L (ref 136–145)
WBC # BLD AUTO: 6.3 X10(3) UL (ref 4–11)

## 2023-12-09 PROCEDURE — 80048 BASIC METABOLIC PNL TOTAL CA: CPT

## 2023-12-09 PROCEDURE — 85025 COMPLETE CBC W/AUTO DIFF WBC: CPT

## 2023-12-09 PROCEDURE — 36415 COLL VENOUS BLD VENIPUNCTURE: CPT

## 2023-12-11 ENCOUNTER — HOSPITAL ENCOUNTER (OUTPATIENT)
Facility: HOSPITAL | Age: 63
Setting detail: HOSPITAL OUTPATIENT SURGERY
Discharge: HOME | End: 2023-12-11
Attending: SURGERY | Admitting: SURGERY
Payer: COMMERCIAL

## 2023-12-11 ENCOUNTER — ANESTHESIA EVENT (OUTPATIENT)
Dept: SURGERY | Facility: HOSPITAL | Age: 63
End: 2023-12-11
Payer: COMMERCIAL

## 2023-12-11 ENCOUNTER — ANESTHESIA (OUTPATIENT)
Dept: SURGERY | Facility: HOSPITAL | Age: 63
End: 2023-12-11
Payer: COMMERCIAL

## 2023-12-11 VITALS
HEART RATE: 79 BPM | RESPIRATION RATE: 18 BRPM | BODY MASS INDEX: 41.96 KG/M2 | DIASTOLIC BLOOD PRESSURE: 79 MMHG | WEIGHT: 228 LBS | TEMPERATURE: 97 F | HEIGHT: 62 IN | OXYGEN SATURATION: 96 % | SYSTOLIC BLOOD PRESSURE: 112 MMHG

## 2023-12-11 DIAGNOSIS — Z01.818 PRE-OP TESTING: Primary | ICD-10-CM

## 2023-12-11 PROCEDURE — 0JBL0ZZ EXCISION OF RIGHT UPPER LEG SUBCUTANEOUS TISSUE AND FASCIA, OPEN APPROACH: ICD-10-PCS | Performed by: SURGERY

## 2023-12-11 PROCEDURE — 88304 TISSUE EXAM BY PATHOLOGIST: CPT | Performed by: SURGERY

## 2023-12-11 RX ORDER — ACETAMINOPHEN 500 MG
1000 TABLET ORAL ONCE AS NEEDED
Status: DISCONTINUED | OUTPATIENT
Start: 2023-12-11 | End: 2023-12-11

## 2023-12-11 RX ORDER — PROCHLORPERAZINE EDISYLATE 5 MG/ML
5 INJECTION INTRAMUSCULAR; INTRAVENOUS EVERY 8 HOURS PRN
Status: DISCONTINUED | OUTPATIENT
Start: 2023-12-11 | End: 2023-12-11

## 2023-12-11 RX ORDER — HYDROCODONE BITARTRATE AND ACETAMINOPHEN 5; 325 MG/1; MG/1
2 TABLET ORAL ONCE AS NEEDED
Status: DISCONTINUED | OUTPATIENT
Start: 2023-12-11 | End: 2023-12-11

## 2023-12-11 RX ORDER — DEXAMETHASONE SODIUM PHOSPHATE 4 MG/ML
VIAL (ML) INJECTION AS NEEDED
Status: DISCONTINUED | OUTPATIENT
Start: 2023-12-11 | End: 2023-12-11 | Stop reason: SURG

## 2023-12-11 RX ORDER — HYDROMORPHONE HYDROCHLORIDE 1 MG/ML
0.4 INJECTION, SOLUTION INTRAMUSCULAR; INTRAVENOUS; SUBCUTANEOUS EVERY 5 MIN PRN
Status: DISCONTINUED | OUTPATIENT
Start: 2023-12-11 | End: 2023-12-11

## 2023-12-11 RX ORDER — SCOLOPAMINE TRANSDERMAL SYSTEM 1 MG/1
1 PATCH, EXTENDED RELEASE TRANSDERMAL ONCE
Status: DISCONTINUED | OUTPATIENT
Start: 2023-12-11 | End: 2023-12-11 | Stop reason: HOSPADM

## 2023-12-11 RX ORDER — ONDANSETRON 2 MG/ML
4 INJECTION INTRAMUSCULAR; INTRAVENOUS EVERY 6 HOURS PRN
Status: DISCONTINUED | OUTPATIENT
Start: 2023-12-11 | End: 2023-12-11

## 2023-12-11 RX ORDER — ONDANSETRON 2 MG/ML
INJECTION INTRAMUSCULAR; INTRAVENOUS AS NEEDED
Status: DISCONTINUED | OUTPATIENT
Start: 2023-12-11 | End: 2023-12-11 | Stop reason: SURG

## 2023-12-11 RX ORDER — SODIUM CHLORIDE, SODIUM LACTATE, POTASSIUM CHLORIDE, CALCIUM CHLORIDE 600; 310; 30; 20 MG/100ML; MG/100ML; MG/100ML; MG/100ML
INJECTION, SOLUTION INTRAVENOUS CONTINUOUS
Status: DISCONTINUED | OUTPATIENT
Start: 2023-12-11 | End: 2023-12-11

## 2023-12-11 RX ORDER — ACETAMINOPHEN 500 MG
1000 TABLET ORAL ONCE
Status: DISCONTINUED | OUTPATIENT
Start: 2023-12-11 | End: 2023-12-11 | Stop reason: HOSPADM

## 2023-12-11 RX ORDER — NALOXONE HYDROCHLORIDE 0.4 MG/ML
0.08 INJECTION, SOLUTION INTRAMUSCULAR; INTRAVENOUS; SUBCUTANEOUS AS NEEDED
Status: DISCONTINUED | OUTPATIENT
Start: 2023-12-11 | End: 2023-12-11

## 2023-12-11 RX ORDER — ROCURONIUM BROMIDE 10 MG/ML
INJECTION, SOLUTION INTRAVENOUS AS NEEDED
Status: DISCONTINUED | OUTPATIENT
Start: 2023-12-11 | End: 2023-12-11 | Stop reason: SURG

## 2023-12-11 RX ORDER — HYDROMORPHONE HYDROCHLORIDE 1 MG/ML
0.2 INJECTION, SOLUTION INTRAMUSCULAR; INTRAVENOUS; SUBCUTANEOUS EVERY 5 MIN PRN
Status: DISCONTINUED | OUTPATIENT
Start: 2023-12-11 | End: 2023-12-11

## 2023-12-11 RX ORDER — CEFAZOLIN SODIUM/WATER 2 G/20 ML
SYRINGE (ML) INTRAVENOUS
Status: DISCONTINUED
Start: 2023-12-11 | End: 2023-12-11

## 2023-12-11 RX ORDER — HYDROMORPHONE HYDROCHLORIDE 1 MG/ML
0.6 INJECTION, SOLUTION INTRAMUSCULAR; INTRAVENOUS; SUBCUTANEOUS EVERY 5 MIN PRN
Status: DISCONTINUED | OUTPATIENT
Start: 2023-12-11 | End: 2023-12-11

## 2023-12-11 RX ORDER — CEFAZOLIN SODIUM/WATER 2 G/20 ML
2 SYRINGE (ML) INTRAVENOUS ONCE
Status: COMPLETED | OUTPATIENT
Start: 2023-12-11 | End: 2023-12-11

## 2023-12-11 RX ORDER — LIDOCAINE HYDROCHLORIDE 10 MG/ML
INJECTION, SOLUTION EPIDURAL; INFILTRATION; INTRACAUDAL; PERINEURAL AS NEEDED
Status: DISCONTINUED | OUTPATIENT
Start: 2023-12-11 | End: 2023-12-11 | Stop reason: SURG

## 2023-12-11 RX ORDER — LIDOCAINE HYDROCHLORIDE AND EPINEPHRINE 10; 10 MG/ML; UG/ML
INJECTION, SOLUTION INFILTRATION; PERINEURAL AS NEEDED
Status: DISCONTINUED | OUTPATIENT
Start: 2023-12-11 | End: 2023-12-11 | Stop reason: HOSPADM

## 2023-12-11 RX ORDER — BUPIVACAINE HYDROCHLORIDE 5 MG/ML
INJECTION, SOLUTION EPIDURAL; INTRACAUDAL AS NEEDED
Status: DISCONTINUED | OUTPATIENT
Start: 2023-12-11 | End: 2023-12-11 | Stop reason: HOSPADM

## 2023-12-11 RX ORDER — EPHEDRINE SULFATE 50 MG/ML
INJECTION INTRAVENOUS AS NEEDED
Status: DISCONTINUED | OUTPATIENT
Start: 2023-12-11 | End: 2023-12-11 | Stop reason: SURG

## 2023-12-11 RX ORDER — HYDROCODONE BITARTRATE AND ACETAMINOPHEN 5; 325 MG/1; MG/1
1 TABLET ORAL ONCE AS NEEDED
Status: DISCONTINUED | OUTPATIENT
Start: 2023-12-11 | End: 2023-12-11

## 2023-12-11 RX ADMIN — SODIUM CHLORIDE, SODIUM LACTATE, POTASSIUM CHLORIDE, CALCIUM CHLORIDE: 600; 310; 30; 20 INJECTION, SOLUTION INTRAVENOUS at 08:21:00

## 2023-12-11 RX ADMIN — DEXAMETHASONE SODIUM PHOSPHATE 8 MG: 4 MG/ML VIAL (ML) INJECTION at 07:54:00

## 2023-12-11 RX ADMIN — EPHEDRINE SULFATE 10 MG: 50 INJECTION INTRAVENOUS at 08:04:00

## 2023-12-11 RX ADMIN — CEFAZOLIN SODIUM/WATER 2 G: 2 G/20 ML SYRINGE (ML) INTRAVENOUS at 07:45:00

## 2023-12-11 RX ADMIN — ONDANSETRON 4 MG: 2 INJECTION INTRAMUSCULAR; INTRAVENOUS at 08:06:00

## 2023-12-11 RX ADMIN — LIDOCAINE HYDROCHLORIDE 50 MG: 10 INJECTION, SOLUTION EPIDURAL; INFILTRATION; INTRACAUDAL; PERINEURAL at 07:41:00

## 2023-12-11 RX ADMIN — ROCURONIUM BROMIDE 40 MG: 10 INJECTION, SOLUTION INTRAVENOUS at 07:41:00

## 2023-12-11 NOTE — DISCHARGE INSTRUCTIONS
Home Care Instructions  Minor Surgery (Excision Lipoma  Dr. Pasha Guajardo should anticipate minor to moderate pain for the first few days. Your surgeon has prescribed for you a pain medication, usually Norco. Take the pain medication as prescribed. You can use Tylenol or Motrin instead of the narcotic pain medication if you desire. If you experience severe nausea or vomiting stop the pain medication and call our office. DIET  Your diet should be as you tolerate. Do not drink alcohol while taking the prescription pain medication. ACTIVITY  You should not drive while you are taking prescription pain medication. Avoid strenuous activity such as running and physical workouts for one week. Normal daily activities are fine, such as climbing stairs. You may shower in 24 hours. Your incision can get wet but do not soak your incision in a tub of water. You may return to work as instructed by your surgeon. CARE OF THE SURGICAL SITE  Your incisions have sutures that will need to be removed in the office in 12-14 days. The Tegaderm and outer gauze dressing may be removed in 48 hours and replaced as needed. Apply Neosporin to your incision twice a day until the sutures are removed. If you experience fever, chills, inability to urinate, nausea with vomiting, severe diarrhea or severe, cramping  abdominal pain please contact your surgeon. APPOINTMENT  You should make an appointment to see your surgeon in 12-14 days. Should you develop any problems prior to your scheduled appointment, do not hesitate to call the office.

## 2023-12-11 NOTE — H&P
[facility]  Outside Information  Office Visit  9/26/2023  Surgery - Goran Moise, Doc Boxer - Female; born [birth year]Encounter Summary, generated on Dec. 11, 2023December 11, 2023   H&P Notes  - documented in this encounter  Jose E Stevens MD - 09/26/2023 11:30 AM CDT  Formatting of this note is different from the original.  Rigo Buchanan is a 58year old female  Patient presents with:   Follow - Up: Scalp cyst    HPI: here for eval of scalp cyst and large mass posterior right thigh    Past Medical History:  Diagnosis Date  Anxiety  Anxiety state  panic syndrome  Arrhythmia  PVC on and off - dx 2015  Cancer ([address]  lobular insitu - lumpectomy - left 2014  Esophageal reflux  History of blood transfusion  at birth - ABO incompatatbilty - no rx 1959  Migraines  SILENT MIGRAINE  Osteoarthrosis, unspecified whether generalized or localized, unspecified site  BACK  PONV (postoperative nausea and vomiting)  SVT (supraventricular tachycardia) (HCC)  Tinnitus  Unifocal PVCs  no medication  Varicose vein  Visual impairment  readers    Past Surgical History:  Procedure Laterality Date  APPENDECTOMY 07/08/2019  CHOLECYSTECTOMY  2004  COLONOSCOPY N/A 7/9/2018  Procedure: COLONOSCOPY; Surgeon: Zulema Simms MD; Location[facility]COPY  LUMPECTOMY LEFT 1/2014  ALH/LCIS  HILLARY BIOPSY STEREO NODULE 2 SITE BILAT (CPT=19081/40149) 2011  ALH/LCIS  OTHER  right knee steroid injection and lubricant 12/20  OTHER SURGICAL HISTORY  Uterine polyp removal  OTHER SURGICAL HISTORY  Cyst on neck removal    Family History  Problem Relation Age of Onset  Cancer Mother  Breast Cancer Mother 54  Other (Other) Mother  Diabetes Sister  Breast Cancer Self  LCIS Self    Social History  Tobacco Use  Smoking status: Never  Smokeless tobacco: Never  Vaping Use  Vaping Use: Never used  Alcohol use: No  Alcohol/week: 0.0 standard drinks of alcohol  Drug use: No    EXAM: scalp cyst stable  Large hemangioma right posterior thigh    IMPRESSION: will address thigh hemangioma 1st    PLAN: excision  This will require sedation  Post of scarring reviewed

## 2023-12-11 NOTE — OPERATIVE REPORT
[facility]    PATIENT'S NAME: Eveline Lopez   ATTENDING PHYSICIAN: Dominick Romero M.D. OPERATING PHYSICIAN: Dominick Romero M.D. PATIENT ACCOUNT#:   [de-identified]    LOCATION:  [facility] 11 EDWP 10  MEDICAL RECORD #:   [id number]       YOB: 1960  ADMISSION DATE:       12/11/2023      OPERATION DATE:  12/11/2023    OPERATIVE REPORT      PREOPERATIVE DIAGNOSIS:  Large hemangioma, right thigh. POSTOPERATIVE DIAGNOSIS:  Large hemangioma, right thigh. PROCEDURE:  Excision of hemangioma of right thigh with a 10 cm layered wound closure. ASSISTANT:  Kallie Beverly PA-C. Her assistance was necessary for prepping, draping, positioning, and suturing. ANESTHESIA:  Local with sedation. OPERATIVE TECHNIQUE:  The patient was brought in the operating room and placed on the operating room table in supine position. She was then positioned left side down, right side up with appropriate pillows and protection and secured in place. Sedation was provided by the anesthesia team.  The area was prepped and draped in usual sterile fashion. Lidocaine 1% and Marcaine 0.5% used as a local anesthetic. I made a long elliptical skin incision around this mass. The length of the incision was 10 cm in length. The mass measured slightly more than 4 cm in diameter. I excised the mass in its entirety and sent it to Pathology for histological examination. The mass was attached to the skin and within the subcutaneous tissue. This was sent to Pathology for histological examination. Hemostasis was obtained with electrocautery. I did a layered wound closure with 3-0 and 4-0 Vicryl. The length of the incision was 10 cm in length. Steri-Strips and sterile dressing were provided. The patient tolerated the procedure well. She was taken to the recovery room for observation.     Dictated By Dominick Romero M.D.  d: 12/11/2023 08:12:15  t: 12/11/2023 13:43:32  [facility] [id number]  [facility]/

## 2023-12-11 NOTE — ANESTHESIA PROCEDURE NOTES
Airway  Date/Time: 12/11/2023 7:43 AM  Urgency: elective      General Information and Staff    Patient location during procedure: OR  Anesthesiologist: Staci Vicente MD  Resident/CRNA: Nazario Santos CRNA  Performed: CRNA   Performed by: Nazario Santos CRNA  Authorized by: Staci Vicente MD      Indications and Patient Condition  Indications for airway management: anesthesia  Sedation level: deep  Preoxygenated: yes  Patient position: sniffing  Mask difficulty assessment: 1 - vent by mask    Final Airway Details  Final airway type: endotracheal airway      Successful airway: ETT  Cuffed: yes   Successful intubation technique: direct laryngoscopy  Facilitating devices/methods: intubating stylet  Endotracheal tube insertion site: oral  Blade: Giana  Blade size: #3  ETT size (mm): 7.0    Cormack-Lehane Classification: grade IIA - partial view of glottis  Placement verified by: capnometry   Measured from: lips  ETT to lips (cm): 21  Number of attempts at approach: 1

## 2023-12-11 NOTE — OR NURSING
PATIENTS SURGICAL SITE WAS CLEAN AND DRY UPON DISCHARGE.  PATIENT HAD NO PAIN, NAUSEA OR VOMITING DURING POST-OP RECOVERY

## 2023-12-11 NOTE — BRIEF OP NOTE
Pre-Operative Diagnosis: SOFT TISSUE MASS     Post-Operative Diagnosis: SOFT TISSUE MASS      Procedure Performed:   EXCISION OF SOFT TISSUE MASS RIGHT THIGH    Surgeon(s) and Role:     * Harmony Dunaway MD - Primary    Assistant(s):  PA: Vijay Daley       Specimen: soft tissue mass right thigh     Estimated Blood Loss: Blood Output: 2 mL (12/11/2023  8:09 AM)        Tanna Ospina MD  12/11/2023  8:10 AM

## 2023-12-11 NOTE — INTERVAL H&P NOTE
Pre-op Diagnosis: SOFT TISSUE MASS    The above referenced H&P was reviewed by Rich Alexandre MD on 12/11/2023, the patient was examined and no significant changes have occurred in the patient's condition since the H&P was performed. I discussed with the patient and/or legal representative the potential benefits, risks and side effects of this procedure; the likelihood of the patient achieving goals; and potential problems that might occur during recuperation. I discussed reasonable alternatives to the procedure, including risks, benefits and side effects related to the alternatives and risks related to not receiving this procedure. We will proceed with procedure as planned.

## 2023-12-18 ENCOUNTER — ORDER TRANSCRIPTION (OUTPATIENT)
Dept: PHYSICAL THERAPY | Facility: HOSPITAL | Age: 63
End: 2023-12-18

## 2023-12-18 DIAGNOSIS — R42 VERTIGO: Primary | ICD-10-CM

## 2024-02-25 ENCOUNTER — APPOINTMENT (OUTPATIENT)
Dept: GENERAL RADIOLOGY | Age: 64
End: 2024-02-25
Attending: EMERGENCY MEDICINE
Payer: COMMERCIAL

## 2024-02-25 ENCOUNTER — HOSPITAL ENCOUNTER (OUTPATIENT)
Age: 64
Discharge: HOME | End: 2024-02-25
Attending: EMERGENCY MEDICINE
Payer: COMMERCIAL

## 2024-02-25 VITALS
TEMPERATURE: 98 F | SYSTOLIC BLOOD PRESSURE: 144 MMHG | RESPIRATION RATE: 22 BRPM | DIASTOLIC BLOOD PRESSURE: 67 MMHG | OXYGEN SATURATION: 95 % | HEART RATE: 72 BPM

## 2024-02-25 DIAGNOSIS — S63.632A SPRAIN OF INTERPHALANGEAL JOINT OF RIGHT MIDDLE FINGER, INITIAL ENCOUNTER: Primary | ICD-10-CM

## 2024-02-25 PROCEDURE — 99213 OFFICE O/P EST LOW 20 MIN: CPT

## 2024-02-25 PROCEDURE — 73140 X-RAY EXAM OF FINGER(S): CPT | Performed by: EMERGENCY MEDICINE

## 2024-02-25 NOTE — ED PROVIDER NOTES
Patient Seen in: [facility]      History     Chief Complaint   Patient presents with    Arm or Hand Injury     Entered by patient     Stated Complaint: Arm or Hand Injury    Subjective:   HPI    65 yo female reached to grab something on the floor and hyperextended her right third finger. C/o pain and swelling to the PIP joint. No numbness or weakness.     Objective:   Past Medical History:   Diagnosis Date    Anesthesia complication     woke up once in colonoscopy    Anxiety     Anxiety state     panic syndrome    Arrhythmia     PVC on and off - dx 2015    Back problem     Cancer (HCC)     lobular insitu - lumpectomy - left 2014    Esophageal reflux     High cholesterol     History of blood transfusion     at birth - ABO incompatatbilty - no rx 1959    Migraines     SILENT MIGRAINE    Osteoarthrosis, unspecified whether generalized or localized, unspecified site     BACK    SVT (supraventricular tachycardia)     Tinnitus     Unifocal PVCs     no medication    Varicose vein     Visual impairment     readers              Past Surgical History:   Procedure Laterality Date    APPENDECTOMY  07/08/2019    CHOLECYSTECTOMY      2004    COLONOSCOPY N/A 07/09/2018    Procedure: COLONOSCOPY;  Surgeon: Ben Rodriguez MD;  Location:  ENDOSCOPY    LUMPECTOMY LEFT  01/2014    ALH/LCIS    HILLARY BIOPSY STEREO NODULE 1 SITE RIGHT (CPT=19081)  05/2022    X2 SITES--BENIGN    HILLARY BIOPSY STEREO NODULE 2 SITE BILAT (CPT=19081/71176)  2011    ALH/LCIS    OTHER      right knee steroid injection and lubricant 12/20    OTHER SURGICAL HISTORY      Uterine polyp removal    OTHER SURGICAL HISTORY      Cyst on neck removal                Social History     Socioeconomic History    Marital status:    Tobacco Use    Smoking status: Never    Smokeless tobacco: Never   Vaping Use    Vaping Use: Never used   Substance and Sexual Activity    Alcohol use: No     Alcohol/week: 0.0 standard drinks of alcohol    Drug use: No    Other Topics Concern    Caffeine Concern No    Exercise No              Review of Systems    Positive for stated complaint: Arm or Hand Injury  Other systems are as noted in HPI.  Constitutional and vital signs reviewed.      All other systems reviewed and negative except as noted above.    Physical Exam     ED Triage Vitals [02/25/24 1137]   /67   Pulse 72   Resp 22   Temp 97.7 °F (36.5 °C)   Temp src Temporal   SpO2 95 %   O2 Device None (Room air)       Current:/67   Pulse 72   Temp 97.7 °F (36.5 °C) (Temporal)   Resp 22   LMP 01/01/2011   SpO2 95%         Physical Exam  Vitals and nursing note reviewed.   Constitutional:       Appearance: Normal appearance. She is well-developed.   HENT:      Head: Normocephalic and atraumatic.   Cardiovascular:      Rate and Rhythm: Normal rate and regular rhythm.   Pulmonary:      Effort: Pulmonary effort is normal. No respiratory distress.   Musculoskeletal:      Comments: Right hand: tenderness swelling to the PIP joint of the right middle finger. Able to range. No motor or sensory deficit.    Skin:     General: Skin is warm and dry.      Capillary Refill: Capillary refill takes less than 2 seconds.   Neurological:      General: No focal deficit present.      Mental Status: She is alert.      Sensory: No sensory deficit.   Psychiatric:         Mood and Affect: Mood normal.         Behavior: Behavior normal.              ED Course   Labs Reviewed - No data to display                   MDM                                      Medical Decision Making    Sprain vs fracture. Xray images reviewed by myself. No fracture. Discharge home. Ice, elevate, ibuprofen for pain otc. Likely sprain from hyperextension.     Disposition and Plan     Clinical Impression:  1. Sprain of interphalangeal joint of right middle finger, initial encounter         Disposition:  Discharge  2/25/2024 12:34 pm    Follow-up:  Linda Carbone MD  [address]  [address]  [phone]      As needed          Medications Prescribed:  Current Discharge Medication List

## 2024-03-01 ENCOUNTER — HOSPITAL ENCOUNTER (OUTPATIENT)
Dept: MAMMOGRAPHY | Facility: HOSPITAL | Age: 64
Discharge: HOME | End: 2024-03-01
Attending: FAMILY MEDICINE
Payer: COMMERCIAL

## 2024-03-01 DIAGNOSIS — Z12.31 ENCOUNTER FOR SCREENING MAMMOGRAM FOR MALIGNANT NEOPLASM OF BREAST: ICD-10-CM

## 2024-03-01 PROCEDURE — 77063 BREAST TOMOSYNTHESIS BI: CPT | Performed by: FAMILY MEDICINE

## 2024-03-01 PROCEDURE — 77067 SCR MAMMO BI INCL CAD: CPT | Performed by: FAMILY MEDICINE

## 2024-03-19 ENCOUNTER — OFFICE VISIT (OUTPATIENT)
Dept: FAMILY MEDICINE CLINIC | Facility: CLINIC | Age: 64
End: 2024-03-19
Payer: COMMERCIAL

## 2024-03-19 VITALS
SYSTOLIC BLOOD PRESSURE: 124 MMHG | DIASTOLIC BLOOD PRESSURE: 74 MMHG | BODY MASS INDEX: 42.44 KG/M2 | HEART RATE: 68 BPM | WEIGHT: 230.63 LBS | OXYGEN SATURATION: 98 % | RESPIRATION RATE: 16 BRPM | HEIGHT: 62 IN

## 2024-03-19 DIAGNOSIS — Z78.0 POSTMENOPAUSAL: ICD-10-CM

## 2024-03-19 DIAGNOSIS — K21.9 GASTROESOPHAGEAL REFLUX DISEASE, UNSPECIFIED WHETHER ESOPHAGITIS PRESENT: ICD-10-CM

## 2024-03-19 DIAGNOSIS — E66.01 CLASS 3 SEVERE OBESITY WITH SERIOUS COMORBIDITY AND BODY MASS INDEX (BMI) OF 40.0 TO 44.9 IN ADULT, UNSPECIFIED OBESITY TYPE (HCC): Primary | ICD-10-CM

## 2024-03-19 DIAGNOSIS — F41.9 ANXIETY: ICD-10-CM

## 2024-03-19 DIAGNOSIS — I83.90 VARICOSE VEINS: ICD-10-CM

## 2024-03-19 DIAGNOSIS — R00.2 PALPITATIONS: ICD-10-CM

## 2024-03-19 PROBLEM — R53.83 OTHER FATIGUE: Status: ACTIVE | Noted: 2024-03-19

## 2024-03-19 PROBLEM — R73.01 IMPAIRED FASTING GLUCOSE: Status: ACTIVE | Noted: 2024-03-19

## 2024-03-19 PROBLEM — Z09 POSTOPERATIVE EXAMINATION: Status: ACTIVE | Noted: 2023-12-08

## 2024-03-19 PROBLEM — I49.3 VENTRICULAR PREMATURE DEPOLARIZATION: Status: ACTIVE | Noted: 2019-05-17

## 2024-03-19 PROCEDURE — 3078F DIAST BP <80 MM HG: CPT | Performed by: STUDENT IN AN ORGANIZED HEALTH CARE EDUCATION/TRAINING PROGRAM

## 2024-03-19 PROCEDURE — 99214 OFFICE O/P EST MOD 30 MIN: CPT | Performed by: STUDENT IN AN ORGANIZED HEALTH CARE EDUCATION/TRAINING PROGRAM

## 2024-03-19 PROCEDURE — 3074F SYST BP LT 130 MM HG: CPT | Performed by: STUDENT IN AN ORGANIZED HEALTH CARE EDUCATION/TRAINING PROGRAM

## 2024-03-19 PROCEDURE — 3008F BODY MASS INDEX DOCD: CPT | Performed by: STUDENT IN AN ORGANIZED HEALTH CARE EDUCATION/TRAINING PROGRAM

## 2024-03-19 RX ORDER — PANTOPRAZOLE SODIUM 40 MG/1
40 TABLET, DELAYED RELEASE ORAL
Qty: 90 TABLET | Refills: 0 | Status: SHIPPED | OUTPATIENT
Start: 2024-03-19

## 2024-03-19 NOTE — PROGRESS NOTES
[facility] Family Medicine Office Note    HPI:     Tyrell Harris is a 64 year old female presenting to [facility] care and for multiple issues noted below.     Patient had been to weight loss clinic. Had initial consult on 2/9/23, apparently had initial weight of 235 lbs. Lost about 8 lbs after this initial consult. 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 Her  also apparently may have metastatic prostate cancer. She is currently seeing a therapist. She used to work as nurse. She started tracking her food recently on her phone. Patient prefers to avoid pharmacotherapy    Has hx of GERD, recent flare up of heart burn. Apparently out of pantoprazole 40 mg for a while, needs refill.     Taking atenolol 25 mg BID for palpitations. Sees cardiology (Dr. Ferraro and Dr. Lrason) . Also saw cardiology in past for dizziness. Planning on getting ECHO next month.     Has hx of anxiety, on paroxetine, sees psych.     Has hx of varicose veins in bilateral lower extremities.     She endorses hx of LCIS of left breast, s/p lumpectomy.     Sees gyne for pap smears.     HISTORY:  Past Medical History:   Diagnosis Date    Anesthesia complication     woke up once in colonoscopy    Anxiety     Anxiety state     panic syndrome    Arrhythmia     PVC on and off - dx 2015    Back problem     Esophageal reflux     High cholesterol     History of blood transfusion     at birth - ABO incompatatbilty - no rx 1959    Migraines     SILENT MIGRAINE    Osteoarthrosis, unspecified whether generalized or localized, unspecified site     BACK    SVT (supraventricular tachycardia)     Tinnitus     Unifocal PVCs     no medication    Varicose vein     Visual impairment     readers      Past Surgical History:   Procedure Laterality Date    APPENDECTOMY  07/08/2019    CHOLECYSTECTOMY      2004    COLONOSCOPY N/A 07/09/2018    Procedure: COLONOSCOPY;  Surgeon: Ben Rodriguez MD;  Location:   ENDOSCOPY    HILLARY BIOPSY STEREO NODULE 1 SITE RIGHT (CPT=19081)  05/2022    X2 SITES--BENIGN    HILLARY BIOPSY STEREO NODULE 2 SITE BILAT (CPT=19081/02530)  2011    ALH/LCIS    HILLARY LOCALIZATION WIRE 1 SITE LEFT (CPT=19281)      LCIS 2014    OTHER      right knee steroid injection and lubricant 12/20    OTHER SURGICAL HISTORY      Uterine polyp removal    OTHER SURGICAL HISTORY      Cyst on neck removal      Family History   Problem Relation Age of Onset    LCIS Self     Cancer Mother     Breast Cancer Mother 55    Other (Other) Mother     Other (Lung cancer) Father     Diabetes Sister       Social History:   Social History     Socioeconomic History    Marital status:    Tobacco Use    Smoking status: Never    Smokeless tobacco: Never   Vaping Use    Vaping Use: Never used   Substance and Sexual Activity    Alcohol use: No     Alcohol/week: 0.0 standard drinks of alcohol    Drug use: No   Other Topics Concern    Caffeine Concern No    Exercise No        Medications (Active prior to today's visit):  Current Outpatient Medications   Medication Sig Dispense Refill    pantoprazole 40 MG Oral Tab EC Take 1 tablet (40 mg total) by mouth every morning before breakfast. 90 tablet 0    PARoxetine 10 MG Oral Tab TAKE 1.5 TABLET (15 MG) BY ORAL ROUTE ONCE DAILY      atenolol 25 MG Oral Tab Take 1 tablet (25 mg total) by mouth 2 (two) times daily.      magnesium 250 MG Oral Tab Take 1 tablet (250 mg total) by mouth daily. Magnesium citrate      ALPRAZolam 0.5 MG Oral Tab Take 0.5 tablets (0.25 mg total) by mouth as needed.  0    Calcium Carbonate Antacid (TUMS) 500 MG Oral Chew Tab Chew 1 tablet (500 mg total) by mouth as needed.      Multiple Vitamin (TAB-A-JALEN) Oral Tab Take 1 tablet by mouth daily.         Allergies:  Allergies   Allergen Reactions    Ciprofloxacin Tightness in Throat and OTHER (SEE COMMENTS)    Flu Virus Vaccine ARRHYTHMIA, NAUSEA ONLY and PALPITATIONS    Avelox [Moxifloxacin Hydrochloride] OTHER (SEE  COMMENTS)     Tachycardia\"hear pounding\"    Codeine [Opioid Analgesics]      nausea    Peanut Oil      Rapid heart beat    Shellfish-Derived Products      Itchy throat    Soybean Oil      Sensitivity unexplained by patient         ROS:   Review of Systems   Constitutional:  Negative for chills and fever.   Gastrointestinal:  Positive for heartburn.     Otherwise see HPI    PHYSICAL EXAM:   /74 (BP Location: Left arm, Patient Position: Sitting)   Pulse 68   Resp 16   Ht 5' 2\" (1.575 m)   Wt 230 lb 9.6 oz (104.6 kg)   LMP 01/01/2011   SpO2 98%   BMI 42.18 kg/m²  Estimated body mass index is 42.18 kg/m² as calculated from the following:    Height as of this encounter: 5' 2\" (1.575 m).    Weight as of this encounter: 230 lb 9.6 oz (104.6 kg).   Vital signs reviewed.Appears stated age, well groomed.    Physical Exam  Constitutional:       General: She is not in acute distress.  Cardiovascular:      Rate and Rhythm: Normal rate and regular rhythm.      Heart sounds: Normal heart sounds.   Pulmonary:      Effort: Pulmonary effort is normal.      Breath sounds: Normal breath sounds.   Neurological:      Mental Status: She is alert.           ASSESSMENT/PLAN:     64 year old female presenting to establish care and for multiple issues noted below.       1. Class 3 severe obesity with serious comorbidity and body mass index (BMI) of 40.0 to 44.9 in adult, unspecified obesity type (HCC)  - encourage well-balanced diet with fruits/vegetables, limited fried/fatty foods, and limited take-out   - encourage at least 150 minutes of moderate intensity aerobic activity weekly     2. Gastroesophageal reflux disease, unspecified whether esophagitis present  - provided handout  - pantoprazole 40 MG Oral Tab EC; Take 1 tablet (40 mg total) by mouth every morning before breakfast.  Dispense: 90 tablet; Refill: 0    3. Palpitations  - per cardiology patient has already established with    4. Postmenopausal  - XR DEXA BONE  DENSITOMETRY (CPT=77080); Future    5. Anxiety  - per psych patient has already established with    6. Varicose veins  - patient endorses she has karrie hose at home and is managing with supportive care    Follow-up: in 3-6 months for annual exam     Outcome: Patient verbalizes understanding. Patient is notified to call with any questions, complications, allergies, or worsening or changing symptoms.  Patient is to call with any side effects or complications from the treatments as a result of today.     Total length of visit/charting: approximately 30 min    Pineda Mendoza MD, 03/19/24, 8:29 AM      Please note that portions of this note may have been completed with a voice recognition program. Efforts were made to edit the dictations but occasionally words are mis-transcribed.

## 2024-07-07 ENCOUNTER — PATIENT MESSAGE (OUTPATIENT)
Dept: FAMILY MEDICINE CLINIC | Facility: CLINIC | Age: 64
End: 2024-07-07

## 2024-07-08 DIAGNOSIS — K21.9 GASTROESOPHAGEAL REFLUX DISEASE, UNSPECIFIED WHETHER ESOPHAGITIS PRESENT: ICD-10-CM

## 2024-07-08 RX ORDER — PANTOPRAZOLE SODIUM 40 MG/1
40 TABLET, DELAYED RELEASE ORAL
Qty: 90 TABLET | Refills: 1 | Status: SHIPPED | OUTPATIENT
Start: 2024-07-08

## 2024-07-08 NOTE — TELEPHONE ENCOUNTER
From: Tyrell Harris  To: Pineda Hirschy  Sent: 7/7/2024 9:39 AM CDT  Subject: Yearly labwork    My spouse's job has changed and i was wondering if i could get the lab work we talked about at my last appointment before Thursday 7/11? We have met our deductibles already on his insurance. He lives in [place] and got laid off. Unfortunately, he found out last November he has metastatic prostate cancer and is in the middle of treatment in [place]. Though he was pretty fit before the diagnosis, the treatment is rough so not a good time to get laid off. At this point I may be the one taking over our insurance this Thursday. Thanks

## 2024-07-08 NOTE — TELEPHONE ENCOUNTER
Last office visit: 3/19/2024   Protocol: pass  Requested medication(s) are due for refill today: yes  Requested medication(s) are on the active medication list same strength, form, dose/ sig: yes  Requested medication(s) are managed by provider: yes  Patient has already received a courtsey refill: no     NOV: 7/9/2024  Last Labs: 12/11/2023  Asked to Return: 6/19/2024

## 2024-07-09 ENCOUNTER — OFFICE VISIT (OUTPATIENT)
Dept: FAMILY MEDICINE CLINIC | Facility: CLINIC | Age: 64
End: 2024-07-09
Payer: COMMERCIAL

## 2024-07-09 VITALS
RESPIRATION RATE: 18 BRPM | OXYGEN SATURATION: 97 % | HEIGHT: 62 IN | WEIGHT: 229 LBS | DIASTOLIC BLOOD PRESSURE: 80 MMHG | HEART RATE: 55 BPM | BODY MASS INDEX: 42.14 KG/M2 | SYSTOLIC BLOOD PRESSURE: 128 MMHG

## 2024-07-09 DIAGNOSIS — Z00.00 ANNUAL PHYSICAL EXAM: Primary | ICD-10-CM

## 2024-07-09 DIAGNOSIS — Z00.00 LABORATORY EXAMINATION ORDERED AS PART OF A ROUTINE GENERAL MEDICAL EXAMINATION: ICD-10-CM

## 2024-07-09 PROBLEM — L72.9 SCALP CYST: Status: RESOLVED | Noted: 2019-11-18 | Resolved: 2024-07-09

## 2024-07-09 LAB
ALBUMIN SERPL-MCNC: 3.9 G/DL (ref 3.4–5)
ALBUMIN/GLOB SERPL: 1 {RATIO} (ref 1–2)
ALP LIVER SERPL-CCNC: 64 U/L
ALT SERPL-CCNC: 54 U/L
ANION GAP SERPL CALC-SCNC: 7 MMOL/L (ref 0–18)
AST SERPL-CCNC: 34 U/L (ref 15–37)
BASOPHILS # BLD AUTO: 0.05 X10(3) UL (ref 0–0.2)
BASOPHILS NFR BLD AUTO: 0.7 %
BILIRUB SERPL-MCNC: 1 MG/DL (ref 0.1–2)
BUN BLD-MCNC: 16 MG/DL (ref 9–23)
CALCIUM BLD-MCNC: 9.7 MG/DL (ref 8.5–10.1)
CHLORIDE SERPL-SCNC: 106 MMOL/L (ref 98–112)
CHOLEST SERPL-MCNC: 246 MG/DL (ref ?–200)
CO2 SERPL-SCNC: 25 MMOL/L (ref 21–32)
CREAT BLD-MCNC: 0.83 MG/DL
EGFRCR SERPLBLD CKD-EPI 2021: 78 ML/MIN/1.73M2 (ref 60–?)
EOSINOPHIL # BLD AUTO: 0.05 X10(3) UL (ref 0–0.7)
EOSINOPHIL NFR BLD AUTO: 0.7 %
ERYTHROCYTE [DISTWIDTH] IN BLOOD BY AUTOMATED COUNT: 12.8 %
FASTING PATIENT LIPID ANSWER: NO
FASTING STATUS PATIENT QL REPORTED: NO
GLOBULIN PLAS-MCNC: 4 G/DL (ref 2.8–4.4)
GLUCOSE BLD-MCNC: 94 MG/DL (ref 70–99)
HCT VFR BLD AUTO: 43.9 %
HDLC SERPL-MCNC: 48 MG/DL (ref 40–59)
HGB BLD-MCNC: 14.9 G/DL
IMM GRANULOCYTES # BLD AUTO: 0.02 X10(3) UL (ref 0–1)
IMM GRANULOCYTES NFR BLD: 0.3 %
LDLC SERPL CALC-MCNC: 153 MG/DL (ref ?–100)
LYMPHOCYTES # BLD AUTO: 2.18 X10(3) UL (ref 1–4)
LYMPHOCYTES NFR BLD AUTO: 30.5 %
MCH RBC QN AUTO: 30.8 PG (ref 26–34)
MCHC RBC AUTO-ENTMCNC: 33.9 G/DL (ref 31–37)
MCV RBC AUTO: 90.7 FL
MONOCYTES # BLD AUTO: 0.75 X10(3) UL (ref 0.1–1)
MONOCYTES NFR BLD AUTO: 10.5 %
NEUTROPHILS # BLD AUTO: 4.09 X10 (3) UL (ref 1.5–7.7)
NEUTROPHILS # BLD AUTO: 4.09 X10(3) UL (ref 1.5–7.7)
NEUTROPHILS NFR BLD AUTO: 57.3 %
NONHDLC SERPL-MCNC: 198 MG/DL (ref ?–130)
OSMOLALITY SERPL CALC.SUM OF ELEC: 287 MOSM/KG (ref 275–295)
PLATELET # BLD AUTO: 275 10(3)UL (ref 150–450)
POTASSIUM SERPL-SCNC: 4.3 MMOL/L (ref 3.5–5.1)
PROT SERPL-MCNC: 7.9 G/DL (ref 6.4–8.2)
RBC # BLD AUTO: 4.84 X10(6)UL
SODIUM SERPL-SCNC: 138 MMOL/L (ref 136–145)
TRIGL SERPL-MCNC: 247 MG/DL (ref 30–149)
TSI SER-ACNC: 1.71 MIU/ML (ref 0.36–3.74)
VLDLC SERPL CALC-MCNC: 48 MG/DL (ref 0–30)
WBC # BLD AUTO: 7.1 X10(3) UL (ref 4–11)

## 2024-07-09 PROCEDURE — 83036 HEMOGLOBIN GLYCOSYLATED A1C: CPT | Performed by: STUDENT IN AN ORGANIZED HEALTH CARE EDUCATION/TRAINING PROGRAM

## 2024-07-09 PROCEDURE — 80050 GENERAL HEALTH PANEL: CPT | Performed by: STUDENT IN AN ORGANIZED HEALTH CARE EDUCATION/TRAINING PROGRAM

## 2024-07-09 PROCEDURE — 80061 LIPID PANEL: CPT | Performed by: STUDENT IN AN ORGANIZED HEALTH CARE EDUCATION/TRAINING PROGRAM

## 2024-07-09 PROCEDURE — 3074F SYST BP LT 130 MM HG: CPT | Performed by: STUDENT IN AN ORGANIZED HEALTH CARE EDUCATION/TRAINING PROGRAM

## 2024-07-09 PROCEDURE — 99397 PER PM REEVAL EST PAT 65+ YR: CPT | Performed by: STUDENT IN AN ORGANIZED HEALTH CARE EDUCATION/TRAINING PROGRAM

## 2024-07-09 PROCEDURE — 3079F DIAST BP 80-89 MM HG: CPT | Performed by: STUDENT IN AN ORGANIZED HEALTH CARE EDUCATION/TRAINING PROGRAM

## 2024-07-09 PROCEDURE — 3008F BODY MASS INDEX DOCD: CPT | Performed by: STUDENT IN AN ORGANIZED HEALTH CARE EDUCATION/TRAINING PROGRAM

## 2024-07-09 NOTE — PROGRESS NOTES
[facility] Family Medicine Office Note    HPI:     Tyrell Harris is a 64 year old female presenting for annual exam.      with recent diagnosis of metastatic prostate cancer. She states she is otherwise coping especially on her Paxil medication for anxiety. She sees psych.     Diet: cut out fast food, trying to do more of a Mediterranean diet  Exercise: goes on walks   Tobacco:denies  Alcohol:denies  Other Drugs: denies    Aspirin use? (age 50-59 with ASCVD risk 10% or greater): not indicated  Breast cancer (biennial screening mammography for women aged 40 to 74 years): not indicated  Cervical cancer screen? (q3 yrs age 21-29, q3 or q5 w/HPV cotesting age 30-65): sees gyne  Colonoscopy screen? (age 45-75 or earlier based on risk): up to date  Depression screen? (PHQ-2 or PHQ-9): PHQ-2 Score of 0  Diabetes screen? (age 35 to 70 who are overweight or obese): A1c ordered  Fall Prevention? (age 65 and older): not indicated  Lipid panel? (age 20-45 with increased risk of CHD or older than 45): ordered  Lung cancer screen if 50-80 w/ 20 pack year smoking history and currently smoking or quit in last 15 yrs? not indicated  Osteoporosis screen? (DEXA in postmenopausal 65 or older at increased risk): scheduled    HISTORY:  Past Medical History:    Acute appendicitis    Anesthesia complication    woke up once in colonoscopy    Anxiety    Anxiety state    panic syndrome    Arrhythmia    PVC on and off - dx 2015    Back problem    Esophageal reflux    High cholesterol    History of blood transfusion    at birth - ABO incompatatbilty - no rx 1959    Migraines    SILENT MIGRAINE    Osteoarthrosis, unspecified whether generalized or localized, unspecified site    BACK    Scalp cyst    SVT (supraventricular tachycardia) (HCC)    Tinnitus    Unifocal PVCs    no medication    Varicose vein    Visual impairment    readers      Past Surgical History:   Procedure Laterality Date    Appendectomy  07/08/2019     Cholecystectomy      2004    Colonoscopy N/A 07/09/2018    Procedure: COLONOSCOPY;  Surgeon: Ben Rodriguez MD;  Location:  ENDOSCOPY    Robbie biopsy stereo nodule 1 site right (cpt=19081)  05/2022    X2 SITES--BENIGN    Robbie biopsy stereo nodule 2 site bilat (cpt=19081/24364)  2011    ALH/LCIS    Robbie localization wire 1 site left (cpt=19281)      LCIS 2014    Other      right knee steroid injection and lubricant 12/20    Other surgical history      Uterine polyp removal    Other surgical history      Cyst on neck removal      Family History   Problem Relation Age of Onset    LCIS Self     Cancer Mother     Breast Cancer Mother 55    Other (Other) Mother     Other (Lung cancer) Father     Diabetes Sister       Social History:   Social History     Socioeconomic History    Marital status:    Tobacco Use    Smoking status: Never    Smokeless tobacco: Never   Vaping Use    Vaping status: Never Used   Substance and Sexual Activity    Alcohol use: No     Alcohol/week: 0.0 standard drinks of alcohol    Drug use: No   Other Topics Concern    Caffeine Concern No    Exercise No     Social Determinants of Health     Physical Activity: Insufficiently Active (4/4/2019)    Received from [facility] [employer]    Exercise Vital Sign     Days of Exercise per Week: 7 days     Minutes of Exercise per Session: 20 min        Medications (Active prior to today's visit):  Current Outpatient Medications   Medication Sig Dispense Refill    pantoprazole 40 MG Oral Tab EC TAKE 1 TABLET BY MOUTH EVERY DAY IN THE MORNING BEFORE BREAKFAST 90 tablet 1    PARoxetine 10 MG Oral Tab TAKE 1.5 TABLET (15 MG) BY ORAL ROUTE ONCE DAILY      atenolol 25 MG Oral Tab Take 1 tablet (25 mg total) by mouth 2 (two) times daily.      magnesium 250 MG Oral Tab Take 1 tablet (250 mg total) by mouth daily. Magnesium citrate      ALPRAZolam 0.5 MG Oral Tab Take 0.5 tablets (0.25 mg total) by mouth as needed.  0    Calcium Carbonate  Antacid (TUMS) 500 MG Oral Chew Tab Chew 1 tablet (500 mg total) by mouth as needed.      Multiple Vitamin (TAB-A-JALEN) Oral Tab Take 1 tablet by mouth daily.         Allergies:  Allergies   Allergen Reactions    Ciprofloxacin Tightness in Throat and OTHER (SEE COMMENTS)    Flu Virus Vaccine ARRHYTHMIA, NAUSEA ONLY and PALPITATIONS    Avelox [Moxifloxacin Hydrochloride] OTHER (SEE COMMENTS)     Tachycardia\"hear pounding\"    Codeine [Opioid Analgesics]      nausea    Peanut Oil      Rapid heart beat    Shellfish-Derived Products      Itchy throat    Soybean Oil      Sensitivity unexplained by patient         ROS:   Review of Systems   Constitutional:  Negative for chills and fever.     Otherwise see HPI    PHYSICAL EXAM:   /80   Pulse 55   Resp 18   Ht 5' 2\" (1.575 m)   Wt 229 lb (103.9 kg)   LMP 01/01/2011   SpO2 97%   BMI 41.88 kg/m²  Estimated body mass index is 41.88 kg/m² as calculated from the following:    Height as of this encounter: 5' 2\" (1.575 m).    Weight as of this encounter: 229 lb (103.9 kg).   Vital signs reviewed.Appears stated age, well groomed.    Physical Exam  Constitutional:       General: She is not in acute distress.  HENT:      Nose: Nose normal.      Mouth/Throat:      Mouth: Mucous membranes are moist.      Pharynx: Oropharynx is clear.   Eyes:      Conjunctiva/sclera: Conjunctivae normal.      Pupils: Pupils are equal, round, and reactive to light.   Cardiovascular:      Rate and Rhythm: Normal rate and regular rhythm.      Heart sounds: Normal heart sounds.   Pulmonary:      Effort: Pulmonary effort is normal.      Breath sounds: Normal breath sounds.   Abdominal:      General: Bowel sounds are normal.      Palpations: Abdomen is soft.      Tenderness: There is no abdominal tenderness. There is no guarding or rebound.   Lymphadenopathy:      Cervical: No cervical adenopathy.   Neurological:      Mental Status: She is alert.           ASSESSMENT/PLAN:     64 year old female  presenting for annual exam.       1. Annual physical exam  - encourage well-balanced diet with fruits/vegetables, limited fried/fatty foods, and limited take-out   - encourage at least 150 minutes of moderate intensity aerobic activity weekly     2. Laboratory examination ordered as part of a routine general medical examination  - CBC With Differential With Platelet; Future  - Comp Metabolic Panel (14); Future  - Hemoglobin A1C; Future  - TSH W Reflex To Free T4; Future  - Lipid Panel; Future    Follow-up: as needed    Outcome: Patient verbalizes understanding. Patient is notified to call with any questions, complications, allergies, or worsening or changing symptoms.  Patient is to call with any side effects or complications from the treatments as a result of today.     Total length of visit/charting: approximately 31 min    Pineda Mendoza MD, 07/09/24, 3:42 PM      Please note that portions of this note may have been completed with a voice recognition program. Efforts were made to edit the dictations but occasionally words are mis-transcribed.

## 2024-07-10 ENCOUNTER — HOSPITAL ENCOUNTER (OUTPATIENT)
Dept: BONE DENSITY | Age: 64
Discharge: HOME | End: 2024-07-10
Attending: STUDENT IN AN ORGANIZED HEALTH CARE EDUCATION/TRAINING PROGRAM
Payer: COMMERCIAL

## 2024-07-10 DIAGNOSIS — Z78.0 POSTMENOPAUSAL: ICD-10-CM

## 2024-07-10 PROCEDURE — 77080 DXA BONE DENSITY AXIAL: CPT | Performed by: STUDENT IN AN ORGANIZED HEALTH CARE EDUCATION/TRAINING PROGRAM

## 2024-07-11 LAB — HGBA1C: 5.7 %

## 2024-07-23 ENCOUNTER — APPOINTMENT (OUTPATIENT)
Dept: CT IMAGING | Facility: HOSPITAL | Age: 64
End: 2024-07-23
Attending: EMERGENCY MEDICINE
Payer: COMMERCIAL

## 2024-07-23 ENCOUNTER — HOSPITAL ENCOUNTER (EMERGENCY)
Facility: HOSPITAL | Age: 64
Discharge: HOME | End: 2024-07-23
Attending: EMERGENCY MEDICINE
Payer: COMMERCIAL

## 2024-07-23 VITALS
BODY MASS INDEX: 44.57 KG/M2 | HEIGHT: 60 IN | TEMPERATURE: 98 F | WEIGHT: 227 LBS | RESPIRATION RATE: 14 BRPM | SYSTOLIC BLOOD PRESSURE: 134 MMHG | DIASTOLIC BLOOD PRESSURE: 69 MMHG | OXYGEN SATURATION: 100 % | HEART RATE: 54 BPM

## 2024-07-23 DIAGNOSIS — R42 DIZZINESS: Primary | ICD-10-CM

## 2024-07-23 LAB
ALBUMIN SERPL-MCNC: 4.2 G/DL (ref 3.2–4.8)
ALBUMIN/GLOB SERPL: 1.4 {RATIO} (ref 1–2)
ALP LIVER SERPL-CCNC: 65 U/L
ALT SERPL-CCNC: 39 U/L
ANION GAP SERPL CALC-SCNC: 6 MMOL/L (ref 0–18)
APTT PPP: 25.9 SECONDS (ref 23–36)
AST SERPL-CCNC: 32 U/L (ref ?–34)
BASOPHILS # BLD AUTO: 0.03 X10(3) UL (ref 0–0.2)
BASOPHILS NFR BLD AUTO: 0.5 %
BILIRUB SERPL-MCNC: 0.7 MG/DL (ref 0.2–1.1)
BUN BLD-MCNC: 17 MG/DL (ref 9–23)
CALCIUM BLD-MCNC: 9.1 MG/DL (ref 8.7–10.4)
CHLORIDE SERPL-SCNC: 108 MMOL/L (ref 98–112)
CO2 SERPL-SCNC: 26 MMOL/L (ref 21–32)
CREAT BLD-MCNC: 0.92 MG/DL
EGFRCR SERPLBLD CKD-EPI 2021: 69 ML/MIN/1.73M2 (ref 60–?)
EOSINOPHIL # BLD AUTO: 0.08 X10(3) UL (ref 0–0.7)
EOSINOPHIL NFR BLD AUTO: 1.4 %
ERYTHROCYTE [DISTWIDTH] IN BLOOD BY AUTOMATED COUNT: 12.8 %
GLOBULIN PLAS-MCNC: 3 G/DL (ref 2.8–4.4)
GLUCOSE BLD-MCNC: 116 MG/DL (ref 70–99)
GLUCOSE BLD-MCNC: 121 MG/DL (ref 70–99)
HCT VFR BLD AUTO: 41.6 %
HGB BLD-MCNC: 14.2 G/DL
IMM GRANULOCYTES # BLD AUTO: 0.01 X10(3) UL (ref 0–1)
IMM GRANULOCYTES NFR BLD: 0.2 %
INR BLD: 0.99 (ref 0.8–1.2)
LYMPHOCYTES # BLD AUTO: 1.68 X10(3) UL (ref 1–4)
LYMPHOCYTES NFR BLD AUTO: 29.5 %
MCH RBC QN AUTO: 30.9 PG (ref 26–34)
MCHC RBC AUTO-ENTMCNC: 34.1 G/DL (ref 31–37)
MCV RBC AUTO: 90.6 FL
MONOCYTES # BLD AUTO: 0.47 X10(3) UL (ref 0.1–1)
MONOCYTES NFR BLD AUTO: 8.3 %
NEUTROPHILS # BLD AUTO: 3.42 X10 (3) UL (ref 1.5–7.7)
NEUTROPHILS # BLD AUTO: 3.42 X10(3) UL (ref 1.5–7.7)
NEUTROPHILS NFR BLD AUTO: 60.1 %
OSMOLALITY SERPL CALC.SUM OF ELEC: 293 MOSM/KG (ref 275–295)
PLATELET # BLD AUTO: 237 10(3)UL (ref 150–450)
POTASSIUM SERPL-SCNC: 3.8 MMOL/L (ref 3.5–5.1)
PROT SERPL-MCNC: 7.2 G/DL (ref 5.7–8.2)
PROTHROMBIN TIME: 13.1 SECONDS (ref 11.6–14.8)
RBC # BLD AUTO: 4.59 X10(6)UL
SODIUM SERPL-SCNC: 140 MMOL/L (ref 136–145)
WBC # BLD AUTO: 5.7 X10(3) UL (ref 4–11)

## 2024-07-23 PROCEDURE — 96361 HYDRATE IV INFUSION ADD-ON: CPT

## 2024-07-23 PROCEDURE — 70450 CT HEAD/BRAIN W/O DYE: CPT | Performed by: EMERGENCY MEDICINE

## 2024-07-23 PROCEDURE — 85730 THROMBOPLASTIN TIME PARTIAL: CPT | Performed by: EMERGENCY MEDICINE

## 2024-07-23 PROCEDURE — 99285 EMERGENCY DEPT VISIT HI MDM: CPT

## 2024-07-23 PROCEDURE — 96360 HYDRATION IV INFUSION INIT: CPT

## 2024-07-23 PROCEDURE — 82962 GLUCOSE BLOOD TEST: CPT

## 2024-07-23 PROCEDURE — 93010 ELECTROCARDIOGRAM REPORT: CPT

## 2024-07-23 PROCEDURE — 85025 COMPLETE CBC W/AUTO DIFF WBC: CPT | Performed by: EMERGENCY MEDICINE

## 2024-07-23 PROCEDURE — 80053 COMPREHEN METABOLIC PANEL: CPT | Performed by: EMERGENCY MEDICINE

## 2024-07-23 PROCEDURE — 85610 PROTHROMBIN TIME: CPT | Performed by: EMERGENCY MEDICINE

## 2024-07-23 PROCEDURE — 99284 EMERGENCY DEPT VISIT MOD MDM: CPT

## 2024-07-23 PROCEDURE — 93005 ELECTROCARDIOGRAM TRACING: CPT

## 2024-07-23 RX ORDER — MECLIZINE HYDROCHLORIDE 25 MG/1
25 TABLET ORAL 3 TIMES DAILY PRN
Qty: 20 TABLET | Refills: 0 | Status: SHIPPED | OUTPATIENT
Start: 2024-07-23

## 2024-07-23 RX ORDER — MECLIZINE HYDROCHLORIDE 25 MG/1
25 TABLET ORAL ONCE
Status: COMPLETED | OUTPATIENT
Start: 2024-07-23 | End: 2024-07-23

## 2024-07-24 LAB
ATRIAL RATE: 59 BPM
P AXIS: 56 DEGREES
P-R INTERVAL: 114 MS
Q-T INTERVAL: 420 MS
QRS DURATION: 84 MS
QTC CALCULATION (BEZET): 415 MS
R AXIS: -11 DEGREES
T AXIS: -2 DEGREES
VENTRICULAR RATE: 59 BPM

## 2024-07-24 NOTE — ED INITIAL ASSESSMENT (HPI)
Pt was shopping at [employer] and started to feel dizzy and nauseous. Pt reports having hx panic attacks but doesn't usually feel nauseous. Pt denies pain. Able to ambulate from ems cart to stretcher without assist.

## 2024-07-24 NOTE — ED PROVIDER NOTES
Patient Seen in: [facility] Emergency Department      History   No chief complaint on file.    Stated Complaint: dizzy    Subjective:   HPI    Ms Harris experienced a sudden onset of dizziness while shopping at [employer]. She was in the cold area of the store, near the fruits and vegetables, when she started feeling dizzy. She initially thought it might be a silent migraine, a condition she has experienced before. She also mentioned feeling a weird pressure in her right ear. The dizziness did not subside, and she felt like she might pass out. She sat down on a display couch under a fan, but the dizziness persisted. She took a Xanax, a medication she had not used in years, but it did not alleviate her symptoms. She also mentioned having experienced panic attacks in the past, related to menopause, but she did not believe this was a panic attack. She felt nauseous and sweaty, and she thought she might pass out. She did not vomit, but she felt like she might. She did not feel any pressure in her chest or palpitations. 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 She did not feel any PVCs, a condition she has experienced before. She also mentioned that she sometimes experiences dizziness when she gets overheated, such as when she works in her garden for about an hour every morning before work. She sometimes uses a walking stick to steady herself. She was unable to stand and remained seated on the couch until her daughter arrived, shortly before the ambulance arrived. She mentioned that her blood pressure is usually not high. She usually steadies her head and the dizziness goes away, but this time it did not.  He also mention that she did not have that much to drink today.    Objective:   Past Medical History:    Acute appendicitis    Anesthesia complication    woke up once in colonoscopy    Anxiety    Anxiety state    panic syndrome    Arrhythmia    PVC on and off - dx 2015    Back problem     Esophageal reflux    High cholesterol    History of blood transfusion    at birth - ABO incompatatbilty - no rx 1959    Migraines    SILENT MIGRAINE    Osteoarthrosis, unspecified whether generalized or localized, unspecified site    BACK    Scalp cyst    SVT (supraventricular tachycardia) (HCC)    Tinnitus    Unifocal PVCs    no medication    Varicose vein    Visual impairment    readers              Past Surgical History:   Procedure Laterality Date    Appendectomy  07/08/2019    Cholecystectomy      2004    Colonoscopy N/A 07/09/2018    Procedure: COLONOSCOPY;  Surgeon: Ben Rodriguez MD;  Location:  ENDOSCOPY    Robbie biopsy stereo nodule 1 site right (cpt=19081)  05/2022    X2 SITES--BENIGN    Robbie biopsy stereo nodule 2 site bilat (cpt=19081/91177)  2011    ALH/LCIS    Robbie localization wire 1 site left (cpt=19281)      LCIS 2014    Other      right knee steroid injection and lubricant 12/20    Other surgical history      Uterine polyp removal    Other surgical history      Cyst on neck removal                Social History     Socioeconomic History    Marital status:    Tobacco Use    Smoking status: Never    Smokeless tobacco: Never   Vaping Use    Vaping status: Never Used   Substance and Sexual Activity    Alcohol use: No     Alcohol/week: 0.0 standard drinks of alcohol    Drug use: No   Other Topics Concern    Caffeine Concern No    Exercise No     Social Determinants of Health     Physical Activity: Insufficiently Active (4/4/2019)    Received from [employer] [employer]    Exercise Vital Sign     Days of Exercise per Week: 7 days     Minutes of Exercise per Session: 20 min              Review of Systems    Positive for stated Chief Complaint: No chief complaint on file.    Other systems are as noted in HPI.  Constitutional and vital signs reviewed.      All other systems reviewed and negative except as noted above.    Physical Exam     ED Triage Vitals [07/23/24 1952]   BP  133/77   Pulse 66   Resp 14   Temp 97.9 °F (36.6 °C)   Temp src Temporal   SpO2 98 %   O2 Device None (Room air)       Current Vitals:   Vital Signs  BP: 134/69  Pulse: 54  Resp: 14  Temp: 97.9 °F (36.6 °C)  Temp src: Temporal  MAP (mmHg): 89    Oxygen Therapy  SpO2: 100 %  O2 Device: None (Room air)            Physical Exam    General: Alert and oriented x3 in no acute distress.  HEENT: Normocephalic, atraumatic, pupils equal round and reactive to light, oropharynx clear.  Neck: Supple.  Cardiovascular: Regular rate and rhythm.  Respiratory: Lungs clear to auscultation.  Extremities: No CCE.  Skin: Warm and dry.  Neurologic: Cranial nerves intact.  Strength 5/5 in all extremities.  Sensory exam grossly intact.    ED Course     Labs Reviewed   COMP METABOLIC PANEL (14) - Abnormal; Notable for the following components:       Result Value    Glucose 121 (*)     All other components within normal limits   POCT GLUCOSE - Abnormal; Notable for the following components:    POC Glucose 116 (*)     All other components within normal limits   PROTHROMBIN TIME (PT) - Normal   PTT, ACTIVATED - Normal   CBC WITH DIFFERENTIAL WITH PLATELET    Narrative:     The following orders were created for panel order CBC With Differential With Platelet.  Procedure                               Abnormality         Status                     ---------                               -----------         ------                     CBC W/ DIFFERENTIAL[[id number]]                              Final result                 Please view results for these tests on the individual orders.   CBC W/ DIFFERENTIAL     EKG    Rate, intervals and axes as noted on EKG Report.  Rate: 59  Rhythm: Sinus bradycardia  Reading: Minimal voltage criteria for LVH, may be normal variant, no acute ischemic abnormality         CT BRAIN OR HEAD (67621)    Result Date: 7/23/2024  PROCEDURE:  CT BRAIN OR HEAD (07696)  COMPARISON:  [facility] , CT, CT BRAIN OR HEAD (09207),  9/19/2016, 5:16 PM.  INDICATIONS:  Patient presents with migraine headaches and blurry vision.  TECHNIQUE:  Noncontrast CT scanning is performed through the brain. Dose reduction techniques were used. Dose information is transmitted to the ACR (American College of Radiology) NRDR (National Radiology Data Registry) which includes the Dose Index Registry.  PATIENT STATED HISTORY: (As transcribed by Technologist)  Patient reports dizziness beginning this afternoon with a feeling of almost passing out. Patient denies migraines/blurry vision. Patient notes a recurring history of silent migraines    FINDINGS:  VENTRICLES/SULCI:  Ventricles and sulci are normal in size.  INTRACRANIAL:  There are no abnormal extraaxial fluid collections.  There is no midline shift.  There are no intraparenchymal brain abnormalities.  There is nothing specific for acute infarct.  There is no hemorrhage or mass lesion.  SINUSES:           No sign of acute sinusitis.  MASTOIDS:          No sign of acute inflammation. SKULL:             No evidence for fracture or osseous abnormality. OTHER:             Multiple, benign partially calcified ovoid nodules within the scalp are again identified and have increased in size, perhaps representing partially calcified sebaceous cysts.            CONCLUSION:  No acute intracranial process or significant disease appreciated.  Please see further details above.    LOCATION:  [facility]   Dictated by (CST): Simon Lambert DO on 7/23/2024 at 9:28 PM     Finalized by (CST): Simon Lambert DO on 7/23/2024 at 9:30 PM           Patient had an NIH stroke scale of 0.  Her dizziness seemed somewhat consistent with vertigo but also she had a component of lightheadedness.  She was given meclizine and bolused with normal saline.  She is feeling much better.  She was ambulatory in the emergency department with a steady gait.         MDM      Patient presents with dizziness.  Differential diagnosis includes but is not limited  to benign vertigo, dehydration, anxiety and stroke.  The patient's symptoms seem most consistent with vertigo.  She thinks it may also have been a migraine syndrome.  She still feels some slight head pressure but does not want medication for it.  Her CT does not show any evidence of hemorrhage or stroke.  Her neurologic exam is intact.  She has responded very well to meclizine and is ambulatory.  We discussed risk of stroke and I think the patient overall is low risk as she has had the symptoms previously.  She feels okay about deferring MRI at this time.  Her EKG has shown a sinus bradycardia.  She does take atenolol.  Her labs do not reveal evidence of dehydration or anemia.  The patient will be discharged home with a refill for her meclizine and was counseled extensively regarding any new neurologic symptoms to return for.                           Medical Decision Making      Disposition and Plan     Clinical Impression:  1. Dizziness         Disposition:  Discharge  7/23/2024 10:41 pm    Follow-up:  Pineda Mendoza MD  [address] [id number] [phone]    Follow up            Medications Prescribed:  Current Discharge Medication List        START taking these medications    Details   meclizine 25 MG Oral Tab Take 1 tablet (25 mg total) by mouth 3 (three) times daily as needed.  Qty: 20 tablet, Refills: 0

## 2024-09-19 ENCOUNTER — OFFICE VISIT (OUTPATIENT)
Dept: FAMILY MEDICINE CLINIC | Facility: CLINIC | Age: 64
End: 2024-09-19
Payer: COMMERCIAL

## 2024-09-19 VITALS
WEIGHT: 229.81 LBS | HEART RATE: 66 BPM | DIASTOLIC BLOOD PRESSURE: 82 MMHG | SYSTOLIC BLOOD PRESSURE: 128 MMHG | OXYGEN SATURATION: 97 % | BODY MASS INDEX: 45 KG/M2

## 2024-09-19 DIAGNOSIS — M54.6 ACUTE LEFT-SIDED THORACIC BACK PAIN: Primary | ICD-10-CM

## 2024-09-19 PROCEDURE — 99213 OFFICE O/P EST LOW 20 MIN: CPT | Performed by: STUDENT IN AN ORGANIZED HEALTH CARE EDUCATION/TRAINING PROGRAM

## 2024-09-19 RX ORDER — METHYLPREDNISOLONE 4 MG
TABLET, DOSE PACK ORAL
Qty: 21 EACH | Refills: 0 | Status: SHIPPED | OUTPATIENT
Start: 2024-09-19

## 2024-09-19 NOTE — PROGRESS NOTES
[facility] Family Medicine Office Note    HPI:     Tyrell Harris is a 64 year old female presenting for thoracic left-sided back pain.     Endorses hx of arthritis of the spine  Was doing yard work about 2 weeks ago but denies aristides antecedent injury  Back pain mostly on left side, can wake her up at night.   She sits in front of a computer for most of her day.   Took advil once which helped.   Denies numbness/weakness/tingling or bowel/bladder incontinence.   Walking can be fine (better than sitting).   Pain mainly localized to lumbar region of back.     HISTORY:  Past Medical History:    Acute appendicitis    Anesthesia complication    woke up once in colonoscopy    Anxiety    Anxiety state    panic syndrome    Arrhythmia    PVC on and off - dx 2015    Back problem    Esophageal reflux    High cholesterol    History of blood transfusion    at birth - ABO incompatatbilty - no rx 1959    Migraines    SILENT MIGRAINE    Osteoarthrosis, unspecified whether generalized or localized, unspecified site    BACK    Scalp cyst    SVT (supraventricular tachycardia) (HCC)    Tinnitus    Unifocal PVCs    no medication    Varicose vein    Visual impairment    readers      Past Surgical History:   Procedure Laterality Date    Appendectomy  07/08/2019    Cholecystectomy      2004    Colonoscopy N/A 07/09/2018    Procedure: COLONOSCOPY;  Surgeon: Ben Rodriguez MD;  Location:  ENDOSCOPY    Robbie biopsy stereo nodule 1 site right (cpt=19081)  05/2022    X2 SITES--BENIGN    Robbie biopsy stereo nodule 2 site bilat (cpt=19081/79480)  2011    ALH/LCIS    Robbie localization wire 1 site left (cpt=19281)      LCIS 2014    Other      right knee steroid injection and lubricant 12/20    Other surgical history      Uterine polyp removal    Other surgical history      Cyst on neck removal      Family History   Problem Relation Age of Onset    LCIS Self     Cancer Mother     Breast Cancer Mother 55    Other (Other) Mother     Other (Lung  cancer) Father     Diabetes Sister       Social History:   Social History     Socioeconomic History    Marital status:    Tobacco Use    Smoking status: Never    Smokeless tobacco: Never   Vaping Use    Vaping status: Never Used   Substance and Sexual Activity    Alcohol use: No     Alcohol/week: 0.0 standard drinks of alcohol    Drug use: No   Other Topics Concern    Caffeine Concern No    Exercise No     Social Determinants of Health     Physical Activity: Insufficiently Active (4/4/2019)    Received from [facility] [employer]    Exercise Vital Sign     Days of Exercise per Week: 7 days     Minutes of Exercise per Session: 20 min        Medications (Active prior to today's visit):  Current Outpatient Medications   Medication Sig Dispense Refill    methylPREDNISolone (MEDROL) 4 MG Oral Tablet Therapy Pack As directed. 21 each 0    tiZANidine 4 MG Oral Tab Take 1 tablet (4 mg total) by mouth every 6 (six) hours as needed. 30 tablet 0    meclizine 25 MG Oral Tab Take 1 tablet (25 mg total) by mouth 3 (three) times daily as needed. 20 tablet 0    pantoprazole 40 MG Oral Tab EC TAKE 1 TABLET BY MOUTH EVERY DAY IN THE MORNING BEFORE BREAKFAST 90 tablet 1    PARoxetine 10 MG Oral Tab TAKE 1.5 TABLET (15 MG) BY ORAL ROUTE ONCE DAILY      atenolol 25 MG Oral Tab Take 1 tablet (25 mg total) by mouth 2 (two) times daily.      magnesium 250 MG Oral Tab Take 1 tablet (250 mg total) by mouth daily. Magnesium citrate      ALPRAZolam 0.5 MG Oral Tab Take 0.5 tablets (0.25 mg total) by mouth as needed.  0    Calcium Carbonate Antacid (TUMS) 500 MG Oral Chew Tab Chew 1 tablet (500 mg total) by mouth as needed.      Multiple Vitamin (TAB-A-JALEN) Oral Tab Take 1 tablet by mouth daily.         Allergies:  Allergies   Allergen Reactions    Ciprofloxacin Tightness in Throat and OTHER (SEE COMMENTS)    Flu Virus Vaccine ARRHYTHMIA, NAUSEA ONLY and PALPITATIONS    Avelox [Moxifloxacin Hydrochloride] OTHER  (SEE COMMENTS)     Tachycardia\"hear pounding\"    Codeine [Opioid Analgesics]      nausea    Peanut Oil      Rapid heart beat    Shellfish-Derived Products      Itchy throat    Soybean Oil      Sensitivity unexplained by patient         ROS:   Review of Systems   Musculoskeletal:  Positive for back pain.     Otherwise see HPI    PHYSICAL EXAM:   /82 (BP Location: Left arm, Patient Position: Sitting, Cuff Size: adult)   Pulse 66   Wt 229 lb 12.8 oz (104.2 kg)   LMP 01/01/2011   SpO2 97%   BMI 44.88 kg/m²  Estimated body mass index is 44.88 kg/m² as calculated from the following:    Height as of 7/23/24: 5' (1.524 m).    Weight as of this encounter: 229 lb 12.8 oz (104.2 kg).   Vital signs reviewed.Appears stated age, well groomed.    Physical Exam  Constitutional:       General: She is not in acute distress.  Musculoskeletal:      Comments: +Mild tenderness to palpation of thoracic left spinal and paraspinal regions. ROM intact at spine.    Neurological:      General: No focal deficit present.      Mental Status: She is alert.           ASSESSMENT/PLAN:     64 year old female presenting for left-sided thoracic back pain.     1. Acute left-sided thoracic back pain  - provided home physical therapy exercises   - methylPREDNISolone (MEDROL) 4 MG Oral Tablet Therapy Pack; As directed.  Dispense: 21 each; Refill: 0  - tiZANidine 4 MG Oral Tab; Take 1 tablet (4 mg total) by mouth every 6 (six) hours as needed.  Dispense: 30 tablet; Refill: 0  - if no improvement with above can contact clinic and imaging can be ordered (thoracic spinal x-ray)    Follow-up: as needed    Outcome: Patient verbalizes understanding. Patient is notified to call with any questions, complications, allergies, or worsening or changing symptoms.  Patient is to call with any side effects or complications from the treatments as a result of today.     Total length of visit/charting: approximately 16 min    Pineda Mendoza MD, 09/19/24, 2:38  PM      Please note that portions of this note may have been completed with a voice recognition program. Efforts were made to edit the dictations but occasionally words are mis-transcribed.

## 2024-09-30 ENCOUNTER — TELEPHONE (OUTPATIENT)
Dept: ORTHOPEDICS CLINIC | Facility: CLINIC | Age: 64
End: 2024-09-30

## 2024-09-30 DIAGNOSIS — M25.562 LEFT KNEE PAIN, UNSPECIFIED CHRONICITY: Primary | ICD-10-CM

## 2024-09-30 NOTE — TELEPHONE ENCOUNTER
Patient scheduled with dr Lopez for New extreme left knee pain with bending it.   Please advise for imaging     Future Appointments   Date Time Provider Department Center   10/23/2024  2:00 PM Garcia Lopez MD [facility]

## 2024-09-30 NOTE — TELEPHONE ENCOUNTER
AMY'ss ordered and scheduled     My chart message sen to patient to shannon earlier for imagining

## 2024-10-07 ENCOUNTER — PATIENT MESSAGE (OUTPATIENT)
Dept: FAMILY MEDICINE CLINIC | Facility: CLINIC | Age: 64
End: 2024-10-07

## 2024-10-07 DIAGNOSIS — G89.29 CHRONIC PAIN OF LEFT KNEE: Primary | ICD-10-CM

## 2024-10-07 DIAGNOSIS — M25.562 CHRONIC PAIN OF LEFT KNEE: Primary | ICD-10-CM

## 2024-10-08 RX ORDER — METHYLPREDNISOLONE 4 MG
TABLET, DOSE PACK ORAL
Qty: 21 EACH | Refills: 0 | Status: SHIPPED | OUTPATIENT
Start: 2024-10-08

## 2024-10-08 NOTE — TELEPHONE ENCOUNTER
From: Tyrell Harris  To: Pineda Mendoza  Sent: 10/7/2024 11:19 PM CDT  Subject: Left knee pain    Though i don't need a referral, I wanted to let you know that my left back & rib pain improved within two days of seeing you-thank you. The next day I got the scripts from [facility] & the pain improved with the PT you gave me. Unfortunately, shortly after that my left knee, rather fast, started hurting. Between the PT for the back pain and lots of yard work, I must have over done it and I wanted you to know I have an appointment with Dr. Lopez for 10/23. Since I made the appointment on 9/30 the pain has gotten more severe. I was using a walking stick and now I will get a walker & I will call the office tomorrow to see if another ortho can see me sooner. Just wanted you to be aware. Thank

## 2024-10-08 NOTE — TELEPHONE ENCOUNTER
If the over the counter tylenol or motrin is not helping much she can try medrol steroid pack (I sent a prescription to her pharmacy on file). If she decides to start this steroid pack she can avoid taking additional ibuprofen or naproxen until the medrol course is completed (but ok to take tylenol). Please notify patient.     Pineda Mendoza MD, 10/08/24, 9:25 AM

## 2024-10-08 NOTE — TELEPHONE ENCOUNTER
Fyi- pt with knee pain, has appt with Dr Lopez 10/23 but trying to be seen sooner  Advised RICE, tylenol/motrin if able

## 2024-10-14 ENCOUNTER — HOSPITAL ENCOUNTER (OUTPATIENT)
Dept: GENERAL RADIOLOGY | Age: 64
Discharge: HOME | End: 2024-10-14
Attending: ORTHOPAEDIC SURGERY
Payer: COMMERCIAL

## 2024-10-14 ENCOUNTER — OFFICE VISIT (OUTPATIENT)
Facility: CLINIC | Age: 64
End: 2024-10-14
Payer: COMMERCIAL

## 2024-10-14 VITALS — WEIGHT: 229.81 LBS | BODY MASS INDEX: 45.12 KG/M2 | HEIGHT: 60 IN

## 2024-10-14 DIAGNOSIS — M17.12 PRIMARY OSTEOARTHRITIS OF LEFT KNEE: Primary | ICD-10-CM

## 2024-10-14 DIAGNOSIS — M25.562 LEFT KNEE PAIN, UNSPECIFIED CHRONICITY: ICD-10-CM

## 2024-10-14 PROCEDURE — 73564 X-RAY EXAM KNEE 4 OR MORE: CPT | Performed by: ORTHOPAEDIC SURGERY

## 2024-10-14 RX ORDER — TRIAMCINOLONE ACETONIDE 40 MG/ML
40 INJECTION, SUSPENSION INTRA-ARTICULAR; INTRAMUSCULAR ONCE
Status: COMPLETED | OUTPATIENT
Start: 2024-10-14 | End: 2024-10-14

## 2024-10-14 RX ADMIN — TRIAMCINOLONE ACETONIDE 40 MG: 40 INJECTION, SUSPENSION INTRA-ARTICULAR; INTRAMUSCULAR at 13:47:00

## 2024-10-14 NOTE — PROCEDURES
Risks and benefits of knee injection discussed with the patient, with risks including but not limited to pain and swelling at the injection site and/or within the knee joint, infection, elevation in blood pressure and/or glucose levels, facial flushing. After informed consent, the patient's left knee was marked, locally anesthetized with skin refrigerant, prepped with topical antiseptic, and injected with a mixture of 1mL 40mg/mL Kenalog, 2mL 1% lidocaine and 2mL 0.5% marcaine through the inferolateral portal.  A band-aid was applied.  The patient tolerated the procedure well.    Dawood Doyle PA-C  [facility] Orthopedic Surgery

## 2024-10-14 NOTE — H&P
[facility] New Patient Note    CC:   Chief Complaint   Patient presents with    Knee Pain     Left knee pain;  *overdid her yard work  ONSET: end of September;   Pain Score: 9 when bending a certain way     HPI: This 64 year old female presents today with complaints of left knee pain.  Patient reports around the end of September that she was performing yard work and felt that she overdid it.  She felt gradual onset of pain deep within her left knee and a gradual inability to flex the knee due to that the pain.  She has a hard time localizing where most of the pain is felt.  She denies any deep clicking or popping within the left knee.  She has been using Advil and topical Voltaren as needed for pain control which has helped.  She denies any previous injections or surgeries into the left knee, but acknowledges previous injections into the right knee which have helped for several years.    Past Medical History:    Acute appendicitis    Anesthesia complication    woke up once in colonoscopy    Anxiety    Anxiety state    panic syndrome    Arrhythmia    PVC on and off - dx 2015    Back problem    Esophageal reflux    High cholesterol    History of blood transfusion    at birth - ABO incompatatbilty - no rx 1960    Migraines    SILENT MIGRAINE    Osteoarthrosis, unspecified whether generalized or localized, unspecified site    BACK    Scalp cyst    SVT (supraventricular tachycardia) (HCC)    Tinnitus    Unifocal PVCs    no medication    Varicose vein    Visual impairment    readers     Past Surgical History:   Procedure Laterality Date    Appendectomy  07/08/2019    Cholecystectomy      2004    Colonoscopy N/A 07/09/2018    Procedure: COLONOSCOPY;  Surgeon: Ben Rodriguez MD;  Location:  ENDOSCOPY    Robbie biopsy stereo nodule 1 site right (cpt=19081)  05/2022    X2 SITES--BENIGN    Robbie biopsy stereo nodule 2 site bilat (cpt=19081/59730)  2011    ALH/LCIS    Robbie localization wire 1 site left (cpt=19281)      LCIS  2014    Other      right knee steroid injection and lubricant 12/20    Other surgical history      Uterine polyp removal    Other surgical history      Cyst on neck removal     Current Outpatient Medications   Medication Sig Dispense Refill    pantoprazole 40 MG Oral Tab EC TAKE 1 TABLET BY MOUTH EVERY DAY IN THE MORNING BEFORE BREAKFAST 90 tablet 1    PARoxetine 10 MG Oral Tab TAKE 1.5 TABLET (15 MG) BY ORAL ROUTE ONCE DAILY      atenolol 25 MG Oral Tab Take 1 tablet (25 mg total) by mouth 2 (two) times daily.      magnesium 250 MG Oral Tab Take 1 tablet (250 mg total) by mouth daily. Magnesium citrate      ALPRAZolam 0.5 MG Oral Tab Take 0.5 tablets (0.25 mg total) by mouth as needed.  0    Calcium Carbonate Antacid (TUMS) 500 MG Oral Chew Tab Chew 1 tablet (500 mg total) by mouth as needed.      Multiple Vitamin (TAB-A-JALEN) Oral Tab Take 1 tablet by mouth daily.      methylPREDNISolone (MEDROL) 4 MG Oral Tablet Therapy Pack As directed. (Patient not taking: Reported on 10/14/2024) 21 each 0    methylPREDNISolone (MEDROL) 4 MG Oral Tablet Therapy Pack As directed. (Patient not taking: Reported on 10/14/2024) 21 each 0    tiZANidine 4 MG Oral Tab Take 1 tablet (4 mg total) by mouth every 6 (six) hours as needed. (Patient not taking: Reported on 10/14/2024) 30 tablet 0    meclizine 25 MG Oral Tab Take 1 tablet (25 mg total) by mouth 3 (three) times daily as needed. (Patient not taking: Reported on 10/14/2024) 20 tablet 0     Allergies[1]  Family History   Problem Relation Age of Onset    LCIS Self     Cancer Mother     Breast Cancer Mother 55    Other (Other) Mother     Other (Lung cancer) Father     Diabetes Sister      Social History     Occupational History    Not on file   Tobacco Use    Smoking status: Never    Smokeless tobacco: Never   Vaping Use    Vaping status: Never Used   Substance and Sexual Activity    Alcohol use: No     Alcohol/week: 0.0 standard drinks of alcohol    Drug use: No    Sexual activity:  Not on file        ROS:  Comprehensive system review obtained and negative except as mentioned above    Physical Exam:    Ht 5' (1.524 m)   Wt 229 lb 12.8 oz (104.2 kg)   LMP 01/01/2011   BMI 44.88 kg/m²   Constitutional: Awake, alert, no distress.   Psychological: Appropriate affect.  Respiratory: Unlabored breathing.  Left lower extremity:  Inspection: skin is intact without any redness or deformity. No appreciable effusion.   Palpation: Tender palpation along the bilateral joint lines and superolateral aspect of the left knee.  Range of motion: Knee can extend to 0 and flex to approximately 90 degrees, limited due to reproduction of deep knee pain.  Knee is stable to valgus and varus stress at 0 and 30 degrees. No laxity with anterior or posterior drawer.   Neuromuscular: Strength is normal and sensation is intact.  Vascular: Extremities are warm and well-perfused.  Lymph: Unremarkable.    Imaging: Imaging was personally viewed, independently interpreted and radiology report read.  Radiographs of the left knee obtained today demonstrates mild/possible medial compartmental joint space narrowing with some subchondral sclerosis along the medial tibia.    Assessment/Plan:  Diagnoses and all orders for this visit:    Primary osteoarthritis of left knee  -     Large joint - left aspiration/injection  -     triamcinolone acetonide (Kenalog-40) 40 MG/ML injection 40 mg      Assessment: 64-year-old female with symptoms most consistent with symptomatic radiographically mild left knee osteoarthritis    Plan: I discussed the etiology, natural history, and management options for symptomatic knee osteoarthritis.  I discussed nonsurgical and surgical treatments, with nonsurgical treatments to include anti-inflammatory medications, injections, activity modification, weight loss, low impact exercise and possible therapy.  Surgery would be with knee replacement and is an elective operation reserved for when nonsurgical  treatments no longer alleviate symptoms sufficiently.  After discussion of the treatment options, the patient endorsed interest in receiving a steroid injection into the left knee in clinic today. A cortisone injection was administered into the left knee in clinic today, please see separate procedure note for additional details. I explained the risks of the injection with the patient including failure to relieve pain and possible cartilage wear with chronic use of injections. I explained to the patient that the cortisone may take 2-3 days to take effect. I explained that the injection could be repeated at least every 3 months as long as it continues to provide relief of symptoms. The patient may contact our office if they are interested in repeating the injection, and we can schedule them for a procedure-only appointment.  We could consider viscosupplementation in the future if symptoms persist.  She need only contact our office if interested.      Dawood Doyle PA-C  [facility]    This note was dictated using Dragon software.  While it was briefly proofread prior to completion, some grammatical, spelling, and word choice errors due to dictation may still occur.         [1]   Allergies  Allergen Reactions    Ciprofloxacin Tightness in Throat and OTHER (SEE COMMENTS)    Flu Virus Vaccine ARRHYTHMIA, NAUSEA ONLY and PALPITATIONS    Avelox [Moxifloxacin Hydrochloride] OTHER (SEE COMMENTS)     Tachycardia\"hear pounding\"    Codeine [Opioid Analgesics]      nausea    Peanut Oil      Rapid heart beat    Shellfish-Derived Products      Itchy throat    Soybean Oil      Sensitivity unexplained by patient

## 2024-10-28 ENCOUNTER — PATIENT MESSAGE (OUTPATIENT)
Dept: FAMILY MEDICINE CLINIC | Facility: CLINIC | Age: 64
End: 2024-10-28

## 2024-10-28 NOTE — TELEPHONE ENCOUNTER
Pt requesting flu exemption letter, see MCM for symptoms she experienced with last flu shot  Flu shot is listed as an allergy in chart

## 2024-10-29 ENCOUNTER — TELEPHONE (OUTPATIENT)
Dept: OBGYN CLINIC | Facility: CLINIC | Age: 64
End: 2024-10-29

## 2024-10-29 NOTE — TELEPHONE ENCOUNTER
Dicussed with Dr. Harris, indicates to Offer patient 10/31/2024 at 1130 am. Let patient know we can attempted endometrial biopsy and if open give Cytotec and recommendations for Ibuprofen. Either way needs to be seen for exam.     Patient states she does not want to proceed with endometrial biopsy at this time. But will come in to be seen. She accepts appointment.

## 2024-10-29 NOTE — TELEPHONE ENCOUNTER
Patient called and took the 1st available appointment with  Page 1-3-09 but she has post menopausal bleeding and was questioning if she should be speaking with someone to maybe get and ultrasound 1st because she has surgery 2021. She asked if someone would call her ASAP

## 2024-10-29 NOTE — TELEPHONE ENCOUNTER
Tyrell calling with postmenopausal bleeding that stated this past Saturday. Patient states bleeding will cover a pantie liner, but not soak it. Changes every 3-4 hours. Denies any clots. Is having some mild abdominal cramping, but does not need medication for it.     We addressed bleeding and pain precautions. Informed will route to Dr. Harris for recommendations. May recommend office visit. Patient asking if ultrasound is warranted, informed will get recommendations from Dr. Harris.     She does want us to know that back in 2021 Dr. Harris attempted in office endometrial biopsy which was unsuccessful d/t position of uterus and note indicates \"cervical stenosis\". She did not tolerate the procedure and Hysteroscopy dilation and curettage was completed. She indicates polyps and fibroids at that time.     To Dr. Harris, office schedule review. RN does not have open to add patient. Please review above and advise. Thank you.

## 2024-10-31 ENCOUNTER — OFFICE VISIT (OUTPATIENT)
Dept: OBGYN CLINIC | Facility: CLINIC | Age: 64
End: 2024-10-31
Payer: COMMERCIAL

## 2024-10-31 VITALS
DIASTOLIC BLOOD PRESSURE: 76 MMHG | BODY MASS INDEX: 43 KG/M2 | HEART RATE: 61 BPM | WEIGHT: 222.19 LBS | SYSTOLIC BLOOD PRESSURE: 121 MMHG

## 2024-10-31 DIAGNOSIS — Z12.4 SCREENING FOR CERVICAL CANCER: ICD-10-CM

## 2024-10-31 DIAGNOSIS — N95.0 POSTMENOPAUSAL BLEEDING: Primary | ICD-10-CM

## 2024-10-31 PROCEDURE — 99203 OFFICE O/P NEW LOW 30 MIN: CPT | Performed by: OBSTETRICS & GYNECOLOGY

## 2024-10-31 NOTE — PROGRESS NOTES
Tyrell M Steven    1960       Chief Complaint   Patient presents with    Consult     DISCUSS postmenopausal bleeding      Patient had hysteroscopy/D&C with me in  for postmenopausal bleeding and had endometrial polyp on path-benign.  Last Friday she started bleeding like a light menstrual period with some mild cramping I rec Diana Kraus- she is concerned because there was cervical stenosis last time.  We discussed that I dilated her cervix for the hysteroscopy and the stenosis may no longer be a problem.      Past Medical History:    Acute appendicitis    Anesthesia complication    woke up once in colonoscopy    Anxiety    Anxiety state    panic syndrome    Arrhythmia    PVC on and off - dx     Back problem    Esophageal reflux    High cholesterol    History of blood transfusion    at birth - ABO incompatatbilty - no rx     Migraines    SILENT MIGRAINE    Osteoarthrosis, unspecified whether generalized or localized, unspecified site    BACK    Scalp cyst    SVT (supraventricular tachycardia) (HCC)    Tinnitus    Unifocal PVCs    no medication    Varicose vein    Visual impairment    readers       Past Surgical History:   Procedure Laterality Date    Appendectomy  2019    Cholecystectomy      2004    Colonoscopy N/A 2018    Procedure: COLONOSCOPY;  Surgeon: Ben Rodriguez MD;  Location:  ENDOSCOPY    Robbie biopsy stereo nodule 1 site right (cpt=19081)  05/2022    X2 SITES--BENIGN    Robbie biopsy stereo nodule 2 site bilat (cpt=19081/80237)      ALH/LCIS    Robbie localization wire 1 site left (cpt=19281)      LCIS     Other      right knee steroid injection and lubricant     Other surgical history      Uterine polyp removal    Other surgical history      Cyst on neck removal        Hx Prior Abnormal Pap: No  Pap Date: 20  Pap Result Notes: PAP/HPV NEG   3/1/24 KIMBERLY BENIGN, DEXA 7/10/24 OSTEOPENIA      Medications Ordered Prior to Encounter[1]      PHYSICAL EXAM:        Constitutional: well developed, well nourished  Head/Face: normocephalic  Abdomen:  soft, nontender, nondistended, no masses  Psychiatric:  Oriented to time, place, person and situation. Appropriate mood and affect    Pelvic Exam:  External Genitalia: normal appearance, hair distribution, and no lesions  Urethral Meatus:  normal in size, location, without lesions and prolapse  Bladder:  No fullness, masses or tenderness  Vagina:  Normal appearance without lesions, no abnormal discharge  Cervix:  Normal without tenderness on motion, os appears to be no longer stenotic pap done  Uterus: normal in size, contour, position, mobility, without tenderness  Adnexa: normal without masses or tenderness, exam limited by body habitus  Perineum: normal  Anus: no hemorhroids  Lymphatic: no palpable pelvic nodes     Tyrell was seen today for consult.    Diagnoses and all orders for this visit:    Postmenopausal bleeding      Rtc for EMB             [1]   Current Outpatient Medications on File Prior to Visit   Medication Sig Dispense Refill    methylPREDNISolone (MEDROL) 4 MG Oral Tablet Therapy Pack As directed. 21 each 0    methylPREDNISolone (MEDROL) 4 MG Oral Tablet Therapy Pack As directed. 21 each 0    tiZANidine 4 MG Oral Tab Take 1 tablet (4 mg total) by mouth every 6 (six) hours as needed. 30 tablet 0    pantoprazole 40 MG Oral Tab EC TAKE 1 TABLET BY MOUTH EVERY DAY IN THE MORNING BEFORE BREAKFAST 90 tablet 1    PARoxetine 10 MG Oral Tab TAKE 1.5 TABLET (15 MG) BY ORAL ROUTE ONCE DAILY      atenolol 25 MG Oral Tab Take 1 tablet (25 mg total) by mouth 2 (two) times daily.      magnesium 250 MG Oral Tab Take 1 tablet (250 mg total) by mouth daily. Magnesium citrate      ALPRAZolam 0.5 MG Oral Tab Take 0.5 tablets (0.25 mg total) by mouth as needed.  0    Calcium Carbonate Antacid (TUMS) 500 MG Oral Chew Tab Chew 1 tablet (500 mg total) by mouth as needed.      Multiple Vitamin (TAB-A-JALEN) Oral Tab Take 1 tablet by mouth  daily.      meclizine 25 MG Oral Tab Take 1 tablet (25 mg total) by mouth 3 (three) times daily as needed. (Patient not taking: Reported on 10/31/2024) 20 tablet 0     No current facility-administered medications on file prior to visit.

## 2024-11-01 LAB — HPV E6+E7 MRNA CVX QL NAA+PROBE: NEGATIVE

## 2024-11-14 ENCOUNTER — PATIENT MESSAGE (OUTPATIENT)
Dept: FAMILY MEDICINE CLINIC | Facility: CLINIC | Age: 64
End: 2024-11-14

## 2024-11-14 ENCOUNTER — OFFICE VISIT (OUTPATIENT)
Dept: OBGYN CLINIC | Facility: CLINIC | Age: 64
End: 2024-11-14
Payer: COMMERCIAL

## 2024-11-14 VITALS
BODY MASS INDEX: 44 KG/M2 | DIASTOLIC BLOOD PRESSURE: 84 MMHG | HEART RATE: 58 BPM | WEIGHT: 224 LBS | SYSTOLIC BLOOD PRESSURE: 143 MMHG

## 2024-11-14 DIAGNOSIS — N95.0 POSTMENOPAUSAL BLEEDING: Primary | ICD-10-CM

## 2024-11-14 PROCEDURE — 58100 BIOPSY OF UTERUS LINING: CPT | Performed by: OBSTETRICS & GYNECOLOGY

## 2024-11-14 NOTE — PROCEDURES
Endometrial Biopsy    Pre-Procedure Care:   Consent was obtained.  Procedure/risks were explained.  Questions were answered.  Correct patient was identified.  Correct side and site were confirmed.    Pregnancy Results: negative from  n/a  test   Birth control method(s) used:      Pre-Medications:    The patient was premedicated with Ibuprofen .    Description of Procedure:  Under satisfactory analgesia, the patient was prepped and draped in the dorsal lithotomy position.   A bivalve speculum was placed in the vagina and the cervix was prepped with Betadine solution.   Single tooth tenaculum placed at the 12 o'clock position.   The endometrial cavity was curetted for pipelle tissue sampling, 1   passes.  Specimen was sent to pathology.   The single tooth tenaculum was removed.   Good hemostasis was noted.  There were no complications.    There was no blood loss.      Discharge instructions were provided to the patient.    Visit Plan:  Await final pathology prior to treatment.

## 2024-12-24 DIAGNOSIS — K21.9 GASTROESOPHAGEAL REFLUX DISEASE, UNSPECIFIED WHETHER ESOPHAGITIS PRESENT: ICD-10-CM

## 2024-12-24 RX ORDER — PANTOPRAZOLE SODIUM 40 MG/1
40 TABLET, DELAYED RELEASE ORAL
Qty: 90 TABLET | Refills: 0 | Status: SHIPPED | OUTPATIENT
Start: 2024-12-24

## 2024-12-24 NOTE — TELEPHONE ENCOUNTER
Last office visit: 9/19/2024   Protocol: pass  Requested medication(s) are due for refill today: yes  Requested medication(s) are on the active medication list same strength, form, dose/ sig: yes  Requested medication(s) are managed by provider: yes  Patient has already received a courtsey refill: no     NOV: none   Last Labs: 7/9/2024  Asked to Return: as needed

## 2025-02-19 ENCOUNTER — PATIENT MESSAGE (OUTPATIENT)
Dept: FAMILY MEDICINE CLINIC | Facility: CLINIC | Age: 65
End: 2025-02-19

## 2025-02-19 DIAGNOSIS — Z12.31 BREAST CANCER SCREENING BY MAMMOGRAM: Primary | ICD-10-CM

## 2025-02-20 NOTE — TELEPHONE ENCOUNTER
LOV 9/19/24   NOV - none     Last screening mammo 3/1/24, advised routine mammo in 12 months   Order pended for approval

## 2025-05-01 ENCOUNTER — HOSPITAL ENCOUNTER (OUTPATIENT)
Dept: MAMMOGRAPHY | Facility: HOSPITAL | Age: 65
Discharge: HOME | End: 2025-05-01
Attending: STUDENT IN AN ORGANIZED HEALTH CARE EDUCATION/TRAINING PROGRAM
Payer: COMMERCIAL

## 2025-05-01 DIAGNOSIS — Z12.31 BREAST CANCER SCREENING BY MAMMOGRAM: ICD-10-CM

## 2025-05-01 PROCEDURE — 77063 BREAST TOMOSYNTHESIS BI: CPT | Performed by: STUDENT IN AN ORGANIZED HEALTH CARE EDUCATION/TRAINING PROGRAM

## 2025-05-01 PROCEDURE — 77067 SCR MAMMO BI INCL CAD: CPT | Performed by: STUDENT IN AN ORGANIZED HEALTH CARE EDUCATION/TRAINING PROGRAM

## 2025-07-10 ENCOUNTER — OFFICE VISIT (OUTPATIENT)
Dept: DERMATOLOGY CLINIC | Facility: CLINIC | Age: 65
End: 2025-07-10
Payer: COMMERCIAL

## 2025-07-10 DIAGNOSIS — L72.11 PILAR CYSTS: ICD-10-CM

## 2025-07-10 DIAGNOSIS — L82.1 SK (SEBORRHEIC KERATOSIS): Primary | ICD-10-CM

## 2025-07-10 DIAGNOSIS — L81.4 LENTIGO: ICD-10-CM

## 2025-07-10 DIAGNOSIS — D22.9 MULTIPLE NEVI: ICD-10-CM

## 2025-07-10 DIAGNOSIS — D23.9 BENIGN NEOPLASM OF SKIN, UNSPECIFIED LOCATION: ICD-10-CM

## 2025-07-10 PROCEDURE — 99203 OFFICE O/P NEW LOW 30 MIN: CPT | Performed by: DERMATOLOGY

## 2025-07-10 RX ORDER — VITAMIN K2 90 MCG
CAPSULE ORAL
COMMUNITY

## 2025-07-10 NOTE — PROGRESS NOTES
The following individual(s) verbally consented to be recorded using ambient AI listening technology and understand that they can each withdraw their consent to this listening technology at any point by asking the clinician to turn off or pause the recording:    Patient name: Tyrell Harris  Additional names:

## 2025-07-14 NOTE — PROGRESS NOTES
Tyrell Harris is a 65 year old female.    CC:    Chief Complaint   Patient presents with    Lesion     \"New Patient\" presents with c/o raised lesion on L cheek. Noticed the lesion had something black underneath it. Denies dryness or itching.  Denies personal Hx of skin cancer. Has family Hx of BCC(Daughter).         HISTORY:    Past Medical History[1]   Past Surgical History[2]   Family History[3]   Short Social Hx on File[4]     Current Medications[5]  Allergies:   Allergies[6]    Past Medical History[7]  Past Surgical History[8]  Social History     Socioeconomic History    Marital status:      Spouse name: Not on file    Number of children: Not on file    Years of education: Not on file    Highest education level: Not on file   Occupational History    Not on file   Tobacco Use    Smoking status: Never    Smokeless tobacco: Never   Vaping Use    Vaping status: Never Used   Substance and Sexual Activity    Alcohol use: No     Alcohol/week: 0.0 standard drinks of alcohol    Drug use: No    Sexual activity: Not on file   Other Topics Concern     Service Not Asked    Blood Transfusions Not Asked    Caffeine Concern No    Occupational Exposure Not Asked    Hobby Hazards Not Asked    Sleep Concern Not Asked    Stress Concern Not Asked    Weight Concern Not Asked    Special Diet Not Asked    Back Care Not Asked    Exercise No    Bike Helmet Not Asked    Seat Belt Not Asked    Self-Exams Not Asked    Grew up on a farm Not Asked    History of tanning Not Asked    Outdoor occupation No    Breast feeding Not Asked    Reaction to local anesthetic No    Pt has a pacemaker No    Pt has a defibrillator No   Social History Narrative    Not on file     Social Drivers of Health     Food Insecurity: Not on file   Transportation Needs: Not on file   Stress: Not on file   Housing Stability: Not on file     Family History[9]    HPI:     HPI:  Chief Complaint   Patient presents with    Lesion     \"New Patient\" presents  with c/o raised lesion on L cheek. Noticed the lesion had something black underneath it. Denies dryness or itching.  Denies personal Hx of skin cancer. Has family Hx of BCC(Daughter).       History of Present Illness  Tyrell Harris is a 65 year old female who presents with concerns about a skin lesion.    She noticed a skin lesion that initially appeared unusual and later had pieces fall off, revealing a black area underneath. The lesion is crusty and scaling. No similar lesions have appeared on her back or other areas recently, although she recalls being checked for sun damage on her chest approximately 20 years ago.    She has a history of sebaceous cysts, which have been removed multiple times over the decades, with each recurrence approximately every ten years. She has a history of sebaceous cysts, which she has heard referred to as pilar cysts, and has had them removed multiple times over the decades, with each recurrence approximately every ten years. Her family history includes her mother and children having similar cysts.    She has bad varicose veins and is very overweight. She has a history of significant sun exposure without sunscreen during her teenage years, which she attributes to her current skin concerns. She does not wear makeup and describes herself as boring and kind of hairy due to her nationality.    Her daughter has a history of basal cell carcinoma due to sun exposure, and her mother and children have a history of sebaceous or pilar cysts. No new lesions on her back or other areas, except for a couple of small lesions on the back of her arm. No recent changes in her skin other than the described lesion.    Patient presents with concerns above.    Patient has been in their usual state of health.     Past notes/ records and appropriate/relevant lab results including pathology and past body maps reviewed. Including outside notes/ PCP notes as appropriate. Updated and new information noted in  current visit.     ROS:  Denies other relevant systemic complaints. See HPI.     History, medications, allergies reviewed as noted.    Allergies:  Ciprofloxacin, Flu virus vaccine, Avelox [moxifloxacin hydrochloride], Codeine [opioid analgesics], Peanut oil, Shellfish-derived products, and Soybean oil      There were no vitals filed for this visit.    Physical Examination:     Well-developed well-nourished patient alert oriented in no acute distress.  Exam performed of appropriate involved areas    Physical Exam  SKIN: Scaling skin lesion with crusting, consistent with benign keratosis.    Multiple light to medium brown, well marginated, uniformly pigmented, macules and papules 6 mm and less scattered on exam. pigmented lesions examined with dermoscopy benign-appearing patterns.     Waxy tannish keratotic papules scattered, cherry-red vascular papules scattered.    See map today's date for lesions noted .  See assessment and plan below for specific lesions.    Otherwise remarkable for lesions as noted on map.    See A/P  below for additional information:    Assessment / plan:    Results  PATHOLOGY  Pathology report: Pyloric cysts identified; sebaceous cysts less likely    No orders of the defined types were placed in this encounter.      Meds & Refills for this Visit:  Requested Prescriptions      No prescriptions requested or ordered in this encounter         Encounter Diagnoses   Name Primary?    SK (seborrheic keratosis) Yes    Lentigo     Multiple nevi     Benign neoplasm of skin, unspecified location     Pilar cysts        Assessment & Plan  Benign keratosis  The lesion is a benign keratosis, characterized by crusty and scaling features. A piece of the lesion fell off, revealing an unusual black area underneath.  - Cryotherapy to manage the crusty and scaling nature of the lesion.    Pilar cysts  She has multiple recurrent pilar cysts, which are benign and typically non-infected unless traumatized. She is  considering removal if they become bothersome.  - Refer to Dr. Jacobson for potential in-office removal of multiple pilar cysts.    Varicose veins  She has varicose veins, likely due to prolonged standing during her career.    Recording duration: 7 minutes    Benign-appearing nevi, no atypical features on dermoscopy reassurance given monitor.     Waxy tan keratotic papules lesions in areas of concern as noted reassurance given.  Benign nature discussed.  Possibility of cryo, alphahydroxy acids over-the-counter retinol's discussed.     No other susupicious lesions on todays  exam.    Please refer to map for specific lesions.  See additional diagnoses.  Pros cons of various therapies, risks benefits discussed.Pathophysiology, terapeutic options reviewed.  See  Medications in grid.  Instructions reviewed at length.    Benign nevi, seborrheic  keratoses, cherry angiomas:  Reassurance regarding other benign skin lesions.    Monitor for new or changing lesions. Signs and symptoms of skin cancer, ABCDE's of melanoma ( additional information available at AAD.org, skincancer.org) Encourage Sunscreen (broad-spectrum, ideally mineral-based-UVA/UVB -SPF 30 or higher) use encouraged, sun protection/sun protective clothing, self exams reviewed Followup as noted RTC ---routine checkup 6 mos -one year or p.r.n.    Encounter Times   Including precharting, reviewing chart, prior notes obtaining history: 10 minutes, medical exam :10 minutes, notes on body map, plan, counseling 10minutes My total time spent caring for the patient on the day of the encounter: 30 minutes     The patient indicates understanding of these issues and agrees to the plan.  The patient is asked to return as noted in follow-up/ above.    This note was generated using Dragon voice recognition software.  Please contact me regarding any confusion resulting from errors in recognition..  Note to patient and family: The 21st Century Cures Act makes medical notes like  these available to patients. However, be advised this is a medical document. It is intended as jsva-kk-svbi communication and monitoring of a patient's care needs. It is written in medical language and may contain abbreviations or verbiage that are unfamiliar. It may appear blunt or direct. Medical documents are intended to carry relevant information, facts as evident and the clinical opinion of the practitioner.       [1]   Past Medical History:   Acute appendicitis    Anesthesia complication    woke up once in colonoscopy    Anxiety    Anxiety state    panic syndrome    Arrhythmia    PVC on and off - dx 2015    Back problem    Esophageal reflux    High cholesterol    History of blood transfusion    at birth - ABO incompatatbilty - no rx 1960    Migraines    SILENT MIGRAINE    Osteoarthrosis, unspecified whether generalized or localized, unspecified site    BACK    Scalp cyst    SVT (supraventricular tachycardia) (HCC)    Tinnitus    Unifocal PVCs    no medication    Varicose vein    Visual impairment    readers   [2]   Past Surgical History:  Procedure Laterality Date    Appendectomy  07/08/2019    Cholecystectomy      2004    Colonoscopy N/A 07/09/2018    Procedure: COLONOSCOPY;  Surgeon: Ben Rodriguez MD;  Location:  ENDOSCOPY    Robbie biopsy stereo nodule 1 site right (cpt=19081)  05/2022    X2 SITES--BENIGN    Robbie biopsy stereo nodule 2 site bilat (cpt=19081/78462)  2011    ALH/LCIS    Robbie localization wire 1 site left (cpt=19281)      LCIS 2014    Other      right knee steroid injection and lubricant 12/20    Other surgical history      Uterine polyp removal    Other surgical history      Cyst on neck removal   [3]   Family History  Problem Relation Age of Onset    LCIS Self 56    Cancer Mother     Breast Cancer Mother 55    Other (Other) Mother     Other (Lung cancer) Father     Diabetes Sister    [4]   Social History  Socioeconomic History    Marital status:    Tobacco Use    Smoking status: Never     Smokeless tobacco: Never   Vaping Use    Vaping status: Never Used   Substance and Sexual Activity    Alcohol use: No     Alcohol/week: 0.0 standard drinks of alcohol    Drug use: No   Other Topics Concern    Caffeine Concern No    Exercise No    Outdoor occupation No    Reaction to local anesthetic No    Pt has a pacemaker No    Pt has a defibrillator No   [5]   Current Outpatient Medications   Medication Sig Dispense Refill    Cholecalciferol (VITAMIN D3) 1000 units Oral Cap       PANTOPRAZOLE 40 MG Oral Tab EC TAKE ONE TABLET BY MOUTH IN THE MORNING BEFORE BREAKFAST 90 tablet 0    PARoxetine 10 MG Oral Tab TAKE 1.5 TABLET (15 MG) BY ORAL ROUTE ONCE DAILY      atenolol 25 MG Oral Tab Take 1 tablet (25 mg total) by mouth 2 (two) times daily.      magnesium 250 MG Oral Tab Take 1 tablet (250 mg total) by mouth daily. Magnesium citrate      ALPRAZolam 0.5 MG Oral Tab Take 0.5 tablets (0.25 mg total) by mouth as needed.  0    Calcium Carbonate Antacid (TUMS) 500 MG Oral Chew Tab Chew 1 tablet (500 mg total) by mouth as needed.      Multiple Vitamin (TAB-A-JALEN) Oral Tab Take 1 tablet by mouth daily.      methylPREDNISolone (MEDROL) 4 MG Oral Tablet Therapy Pack As directed. (Patient not taking: Reported on 11/14/2024) 21 each 0    methylPREDNISolone (MEDROL) 4 MG Oral Tablet Therapy Pack As directed. (Patient not taking: Reported on 11/14/2024) 21 each 0    tiZANidine 4 MG Oral Tab Take 1 tablet (4 mg total) by mouth every 6 (six) hours as needed. (Patient not taking: Reported on 11/14/2024) 30 tablet 0    meclizine 25 MG Oral Tab Take 1 tablet (25 mg total) by mouth 3 (three) times daily as needed. 20 tablet 0   [6]   Allergies  Allergen Reactions    Ciprofloxacin Tightness in Throat and OTHER (SEE COMMENTS)    Flu Virus Vaccine ARRHYTHMIA, NAUSEA ONLY and PALPITATIONS    Avelox [Moxifloxacin Hydrochloride] OTHER (SEE COMMENTS)     Tachycardia\"hear pounding\"    Codeine [Opioid Analgesics]      nausea    Peanut Oil       Rapid heart beat    Shellfish-Derived Products      Itchy throat    Soybean Oil      Sensitivity unexplained by patient   [7]   Past Medical History:   Acute appendicitis    Anesthesia complication    woke up once in colonoscopy    Anxiety    Anxiety state    panic syndrome    Arrhythmia    PVC on and off - dx 2015    Back problem    Esophageal reflux    High cholesterol    History of blood transfusion    at birth - ABO incompatatbilty - no rx 1960    Migraines    SILENT MIGRAINE    Osteoarthrosis, unspecified whether generalized or localized, unspecified site    BACK    Scalp cyst    SVT (supraventricular tachycardia) (HCC)    Tinnitus    Unifocal PVCs    no medication    Varicose vein    Visual impairment    readers   [8]   Past Surgical History:  Procedure Laterality Date    Appendectomy  07/08/2019    Cholecystectomy      2004    Colonoscopy N/A 07/09/2018    Procedure: COLONOSCOPY;  Surgeon: Ben Rodriguez MD;  Location:  ENDOSCOPY    Robbie biopsy stereo nodule 1 site right (cpt=19081)  05/2022    X2 SITES--BENIGN    Robbie biopsy stereo nodule 2 site bilat (cpt=19081/83534)  2011    ALH/LCIS    Robbie localization wire 1 site left (cpt=19281)      LCIS 2014    Other      right knee steroid injection and lubricant 12/20    Other surgical history      Uterine polyp removal    Other surgical history      Cyst on neck removal   [9]   Family History  Problem Relation Age of Onset    LCIS Self 56    Cancer Mother     Breast Cancer Mother 55    Other (Other) Mother     Other (Lung cancer) Father     Diabetes Sister

## (undated) DEVICE — DEVICE SPEC RTRVL MYOSURE

## (undated) DEVICE — HYSTEROSCOPY: Brand: MEDLINE INDUSTRIES, INC.

## (undated) DEVICE — MYOSURE SINGLE USE SEAL SET

## (undated) DEVICE — INSUFFLATION NEEDLE TO ESTABLISH PNEUMOPERITONEUM.: Brand: INSUFFLATION NEEDLE

## (undated) DEVICE — LAP CHOLE/APPY CDS-LF: Brand: MEDLINE INDUSTRIES, INC.

## (undated) DEVICE — Device: Brand: DEFENDO AIR/WATER/SUCTION AND BIOPSY VALVE

## (undated) DEVICE — SUTURE VCRL SZ 3-0 L27IN ABSRB UD L26MM SH

## (undated) DEVICE — SOCK CNSTR 4IN TNPSL UNV SPEC

## (undated) DEVICE — TROCAR: Brand: KII SHIELDED BLADED ACCESS SYSTEM

## (undated) DEVICE — FORCEP BIOPSY RJ4 LG CAP W/ND

## (undated) DEVICE — SOL  .9 3000ML

## (undated) DEVICE — ECHELON FLEX POWERED PLUS ARTICULATING ENDOSCOPIC LINEAR CUTTER , 60MM: Brand: ECHELON FLEX

## (undated) DEVICE — TROCAR: Brand: KII® SLEEVE

## (undated) DEVICE — GAMMEX® NON-LATEX PI TEXTURED SIZE 7.5, STERILE POLYISOPRENE POWDER-FREE SURGICAL GLOVE: Brand: GAMMEX

## (undated) DEVICE — FILTERLINE NASAL ADULT O2/CO2

## (undated) DEVICE — VIOLET BRAIDED (POLYGLACTIN 910), SYNTHETIC ABSORBABLE SUTURE: Brand: COATED VICRYL

## (undated) DEVICE — ENDOSCOPY PACK - LOWER: Brand: MEDLINE INDUSTRIES, INC.

## (undated) DEVICE — Device

## (undated) DEVICE — GAUZE,SPONGE,4"X4",12PLY,STERILE,LF,2'S: Brand: MEDLINE

## (undated) DEVICE — ENDOSCOPY PACK UPPER: Brand: MEDLINE INDUSTRIES, INC.

## (undated) DEVICE — KENDALL SCD EXPRESS SLEEVES, KNEE LENGTH, MEDIUM: Brand: KENDALL SCD

## (undated) DEVICE — STERILE SURGICAL LUBRICANT, METAL TUBE: Brand: SURGILUBE

## (undated) DEVICE — SLEEVE COMPR M KNEE LEN SGL USE KENDALL SCD

## (undated) DEVICE — UNDYED BRAIDED (POLYGLACTIN 910), SYNTHETIC ABSORBABLE SUTURE: Brand: COATED VICRYL

## (undated) DEVICE — MINI LAP PACK-LF: Brand: MEDLINE INDUSTRIES, INC.

## (undated) DEVICE — 1200CC GUARDIAN II: Brand: GUARDIAN

## (undated) DEVICE — ENDOPATH ECHELON ENDOSCOPIC LINEAR CUTTER RELOADS, WHITE, 60MM: Brand: ECHELON ENDOPATH

## (undated) DEVICE — SUTURE VICRYL 5-0 PC-1

## (undated) DEVICE — SOLUTION IRRIG 1000ML 0.9% NACL USP BTL

## (undated) DEVICE — TISSUE RETRIEVAL SYSTEM: Brand: INZII RETRIEVAL SYSTEM

## (undated) DEVICE — STERILE SYNTHETIC POLYISOPRENE POWDER-FREE SURGICAL GLOVES WITH HYDROGEL COATING, SMOOTH FINISH, STRAIGHT FINGER: Brand: PROTEXIS

## (undated) DEVICE — 3M™ RED DOT™ MONITORING ELECTRODE WITH FOAM TAPE AND STICKY GEL, 50/BAG, 20/CASE, 72/PLT 2570: Brand: RED DOT™

## (undated) DEVICE — 3M™ TEGADERM™ TRANSPARENT FILM DRESSING, 1626W, 4 IN X 4-3/4 IN (10 CM X 12 CM), 50 EACH/CARTON, 4 CARTON/CASE: Brand: 3M™ TEGADERM™

## (undated) DEVICE — ENCORE® LATEX ACCLAIM SIZE 6.5, STERILE LATEX POWDER-FREE SURGICAL GLOVE: Brand: ENCORE

## (undated) DEVICE — SET TUBI Y FL CNTRL INFL/OTFL

## (undated) NOTE — LETTER
[address] [facility]  Phone: [phone]  Right Fax: [phone]  CLARIFICATION FOR E-SSS    Sent By:   [id number] Date: 2023     Patient Name: Ko Rodrigues  Surgery Date: 2023  CSN: [id number]     Medical Record: [id number]  : 1960      Age: 58year old        Sex: female    Surgeon(s) and Role:     * Gloria Gomez MD - Primary  FAX: [phone]  Procedure Comments:  EXCISION OF SOFT TISSUE MASS RIGHT THIGH    YOU SHOULD RECEIVE 02 PAGES (INCLUDING COVER SHEET)    Please note that clarification is needed on the Electronic-Surgery Scheduling Sheet (E-SSS)  the original is included with this letter. Please have physician review and make changes on the faxed copy of the E-SSS. Procedure per patient should be include anesthesia. Also, the anesthesia type that is checked on the SSS is CARDIAC ANESTHSIA-please check the appropriate box. Please review and submit change if needed        ALL CHANGES MUST BE DOCUMENTED ON THE E-SSS AND SIGNED BY THE PHYSICIAN    After physician has made the changes and signed the E-SSS please fax it to [phone] and these changes will then be made in Epic by the OR schedulers. If you have any questions please call Pre-Admission Testing at [phone]    Thank you,    Pre-Admission Testing    [facility]  Janes Gonzalez [address] [facility], [address] [place] [address]  Phone: [phone]  Fax: [phone]  www. [place]. [facility]    STATEMENT OF CONFIDENTIALITY: This transmittal is intended only for the use of the individual entity to which is addressed and may contain information that is privileged and confidential. information contained. If the reader of this message IS NOT the intended recipient, you are hereby notified that any disclosure, distribution or copying of this information is strictly prohibited.  If you have received this transmission in error, please notify us immediately by telephone and return the original documents to us at the above address via the [facility]. Thank you.

## (undated) NOTE — LETTER
AUTHORIZATION FOR SURGICAL OPERATION OR OTHER PROCEDURE    1. I hereby authorize Dr. WILMAR BEAVERS, and [facility] staff assigned to my case to perform the following operation and/or procedure at the [facility] site:    _______________________________________________________________________________________________    Endometrial biopsy  _______________________________________________________________________________________________    2.  My physician has explained the nature and purpose of the operation or other procedure, possible alternative methods of treatment, the risks involved, and the possibility of complication to me.  I acknowledge that no guarantee has been made as to the result that may be obtained.  3.  I recognize that, during the course of this operation, or other procedure, unforseen conditions may necessitate additional or different procedure than those listed above.  I, therefore, further authorize and request that the above named physician, his/her physician assistants or designees perform such procedures as are, in his/her professional opinion, necessary and desirable.  4.  Any tissue or organs removed in the operation or other procedure may be disposed of by and at the discretion of the [facility] and [facility].  5.  I understand that in the event of a medical emergency, I will be transported by local paramedics to [facility] or other hospital emergency department.  6.  I certify that I have read and fully understand the above consent to operation and/or other procedure.    7.  I acknowledge that my physician has explained sedation/analgesia administration to me including the risks and benefits.  I consent to the administration of sedation/analgesia as may be necessary or desirable in the judgement of my physician.    Witness signature: ___________________________________________________ Date:  ______/______/_____                     Time:  ________ A.M.  P.M.       Patient Name:  ______________________________________________________  (please print)      Patient signature:  ___________________________________________________             Relationship to Patient:           []  Parent    Responsible person                          []  Spouse  In case of minor or                    [] Other  _____________   Incompetent name:  __________________________________________________                               (please print)      _____________      Responsible person  In case of minor or  Incompetent signature:  _______________________________________________    Statement of Physician  My signature below affirms that prior to the time of the procedure, I have explained to the patient and/or his/her guardian, the risks and benefits involved in the proposed treatment and any reasonable alternative to the proposed treatment.  I have also explained the risks and benefits involved in the refusal of the proposed treatment and have answered the patient's questions.                        Date:  ______/______/_______  Provider                      Signature:  __________________________________________________________       Time:  ___________ A.M    P.M.

## (undated) NOTE — LETTER
19    Patient: Yojana Pap  : 1960 Visit date: 2019    Dear  Dr. Mo Mccabe MD,    Thank you for referring Yojana Pap to my practice. Please find my assessment and plan below.              Assessment   Acute appendicitis with localize

## (undated) NOTE — MR AVS SNAPSHOT
After Visit Summary   12/29/2020    Judge Sidhu    MRN: [id number]           Visit Information     Date & Time  12/29/2020 10:40 AM Provider  Renetta Alvarado MD [address] [place] [address], [address] [place] [address],[address], [facility].  Phone  33 Encounter for gynecological examination without abnormal finding   [680604]    Thickened endometrium   [091968]             We Ordered the Following     Normal Orders This Visit    HPV HIGH RISK , THIN PREP COLLECTION [[id number] CUSTOM]     [facility] [place]  OFFICE VISIT   Primary Care Providers  Treatment for mild illness or injury that does not require immediate attention.  Average cost  $70*   [facility]  Monday – Friday  8:00 am – 8:00 pm   Saturday – Sunday

## (undated) NOTE — ED AVS SNAPSHOT
Nikita Disla   MRN: [id number]    Department:  [facility] Emergency Department   Date of Visit:  7/28/2019           Disclosure     Insurance plans vary and the physician(s) referred by the ER may not be covered by your plan.  Please contact your tell this physician (or your personal doctor if your instructions are to return to your personal doctor) about any new or lasting problems. The primary care or specialist physician will see patients referred from the [facility] Emergency Department.  Anabel Elder

## (undated) NOTE — ED AVS SNAPSHOT
Madi Fam   MRN: [id number]    Department:  [facility] Emergency Department   Date of Visit:  2/17/2020           Disclosure     Insurance plans vary and the physician(s) referred by the ER may not be covered by your plan.  Please contact your tell this physician (or your personal doctor if your instructions are to return to your personal doctor) about any new or lasting problems. The primary care or specialist physician will see patients referred from the [facility] Emergency Department.  Anabel Elder

## (undated) NOTE — ED AVS SNAPSHOT
Keiry Carlson   MRN: [id number]    Department:  [facility] Emergency Department   Date of Visit:  3/14/2019           Disclosure     Insurance plans vary and the physician(s) referred by the ER may not be covered by your plan.  Please contact your tell this physician (or your personal doctor if your instructions are to return to your personal doctor) about any new or lasting problems. The primary care or specialist physician will see patients referred from the [facility] Emergency Department.  Kavita Euceda

## (undated) NOTE — LETTER
OUTSIDE TESTING RESULT REQUEST     IMPORTANT: FOR YOUR IMMEDIATE ATTENTION  Please FAX all test results listed below to: [phone]     Testing already done on or about: 23     * * * * If testing is NOT complete, arrange with patient A.S.A.P. * * * *      Patient Name: Dex Zhu  Surgery Date: 2023  Medical Record: [id number]  CSN: [id number]  : 1960 - A: 58 y     Sex: female  Surgeon(s):  Ayad Leon MD  Procedure: EXCISION OF SOFT TISSUE MASS RIGHT THIGH  Anesthesia Type: MAC     Surgeon: Ayad Leon MD     The following Testing and Time Line are REQUIRED PER ANESTHESIA     EKG READ AND SIGNED WITHIN   90 days      Thank You,   Sent by: Rosana Collier

## (undated) NOTE — LETTER
2019    Return to School / Work    Name: Miguel Duarte        : 1960    To Whom It May Concern,    Miguel Duarte had surgery on 19 and is:    Able to return to school / work with restrictions:  No lifting over: 10 lbs until 19.     Comm

## (undated) NOTE — LETTER
19    Patient: Elina Garcia  : 1960 Visit date: 2019    Dear  Dr. Basil Pemberton MD,    Thank you for referring Elina Garcia to my practice. Please find my assessment and plan below.         Assessment   Scalp cyst  (primary encounter diagn

## (undated) NOTE — LETTER
To Whom It May Concern:    Tyrell Harris is currently under my medical care. Based on patient's medical history (specifically prior adverse event secondary to the influenza vaccination in the past), Tyrell is exempt from receiving any further flu vaccinations.     If you require additional information please contact our office.    Sincerely,        Pineda Mendoza MD  [facility], [address] [address]  [phone]        Document generated by:  Pineda Mendoza MD

## (undated) NOTE — LETTER
AUTHORIZATION FOR SURGICAL OPERATION OR OTHER PROCEDURE    1.  I hereby authorize Dr. Chintan Medrano, and [place] [facility] to perform the following operation and/[facility]:      ENDOMETRIAL BIOPSY       2.  My p Relationship to Patient:           []  Parent    Responsible person                          []  Spouse  In case of minor or                    [] Other  _____________   Incompetent name:  __________________________________________________

## (undated) NOTE — ED AVS SNAPSHOT
Blake Ortiz   MRN: [id number]    Department:  [facility] Emergency Department   Date of Visit:  2/17/2020           Disclosure     Insurance plans vary and the physician(s) referred by the ER may not be covered by your plan.  Please contact your tell this physician (or your personal doctor if your instructions are to return to your personal doctor) about any new or lasting problems. The primary care or specialist physician will see patients referred from the [facility] Emergency Department.  Arthor Bernheim

## (undated) NOTE — ED AVS SNAPSHOT
Hardeep Sosa   MRN: [id number]    Department:  [facility] Emergency Department   Date of Visit:  3/10/2018           Disclosure     Insurance plans vary and the physician(s) referred by the ER may not be covered by your plan.  Please contact your tell this physician (or your personal doctor if your instructions are to return to your personal doctor) about any new or lasting problems. The primary care or specialist physician will see patients referred from the [facility] Emergency Department.  Sam Foster

## (undated) NOTE — LETTER
MINOR CASE LETTER      2/23/2021        Dear Luna Donaldson,    Your are having a Hysteroscopy-Surgical, Dilation and Curettage with Myosure on Thursday,03/11/2021 at 3:00pm at [facility].    Do not eat or drink anything (including water) after midni Please make arrangements for someone to drive you home after your surgery. Call our office now to schedule your post-operative appointment for 2 weeks after surgery.     Please feel free to contact our office at [phone] if you have any questions reg

## (undated) NOTE — ED AVS SNAPSHOT
Madi Fam   MRN: [id number]    Department:  [facility] Emergency Department   Date of Visit:  9/20/2019           Disclosure     Insurance plans vary and the physician(s) referred by the ER may not be covered by your plan.  Please contact your tell this physician (or your personal doctor if your instructions are to return to your personal doctor) about any new or lasting problems. The primary care or specialist physician will see patients referred from the [facility] Emergency Department.  Sai Red

## (undated) NOTE — LETTER
20    Patient: Belkys Montgomery  : 1960 Visit date: 2020    Dear  Dr. Yee Mendes MD,    Thank you for referring Belkys Montgomery to my practice. Please find my assessment and plan below.         Assessment   Leg skin lesion, right  (primary enco

## (undated) NOTE — ED AVS SNAPSHOT
Kirill Maia   MRN: [id number]    Department:  [facility] Emergency Department   Date of Visit:  10/13/2018           Disclosure     Insurance plans vary and the physician(s) referred by the ER may not be covered by your plan.  Please contact your tell this physician (or your personal doctor if your instructions are to return to your personal doctor) about any new or lasting problems. The primary care or specialist physician will see patients referred from the [facility] Emergency Department.  Sadaf Caceres

## (undated) NOTE — LETTER
Nedra Saenz 182 6 13[facility]  [place], [address] [place] [address]    Consent for Operation  Date: __________________                                Time: _______________    1.  I authorize the performance upon Charmayne Sheller the following operation:  Procedure(s procedure has been videotaped, the surgeon will obtain the original videotape. The hospital will not be responsible for storage or maintenance of this tape.   7. For the purpose of advancing medical education, I consent to the admittance of observers to the STATEMENTS REQUIRING INSERTION OR COMPLETION WERE FILLED IN.     Signature of Patient:   ___________________________    When the patient is a minor or mentally incompetent to give consent:  Signature of person authorized to consent for patient: ____________ drugs/illegal medications). Failure to inform my anesthesiologist about these medicines may increase my risk of anesthetic complications. iv. If I am allergic to anything or have had a reaction to anesthesia before.   3. I understand how the anesthesia med I have discussed the procedure and information above with the patient (or patient’s representative) and answered their questions. The patient or their representative has agreed to have anesthesia services.     _______________________________________________

## (undated) NOTE — LETTER
MINOR CASE LETTER      2/23/2021        Dear Quinton Hi,    Your are having a Hysteroscopy-Surgical, Dilation and Curettage with Myosure on Thursday,03/11/2021 at 1:00pm at [facility].    Do not eat or drink anything (including water) after midni Please make arrangements for someone to drive you home after your surgery. Call our office now to schedule your post-operative appointment for 2 weeks after surgery.     Please feel free to contact our office at  if you have any questions reg